# Patient Record
Sex: FEMALE | Race: BLACK OR AFRICAN AMERICAN | Employment: PART TIME | URBAN - METROPOLITAN AREA
[De-identification: names, ages, dates, MRNs, and addresses within clinical notes are randomized per-mention and may not be internally consistent; named-entity substitution may affect disease eponyms.]

---

## 2020-08-25 ENCOUNTER — VIRTUAL VISIT (OUTPATIENT)
Dept: SURGERY | Age: 30
End: 2020-08-25

## 2020-08-25 VITALS — WEIGHT: 275 LBS | HEIGHT: 65 IN | BODY MASS INDEX: 45.82 KG/M2

## 2020-08-25 DIAGNOSIS — M25.569 KNEE PAIN, UNSPECIFIED CHRONICITY, UNSPECIFIED LATERALITY: ICD-10-CM

## 2020-08-25 DIAGNOSIS — N92.6 IRREGULAR MENSES: ICD-10-CM

## 2020-08-25 DIAGNOSIS — E66.01 MORBID OBESITY WITH BMI OF 45.0-49.9, ADULT (HCC): ICD-10-CM

## 2020-08-25 DIAGNOSIS — M54.9 BACK PAIN, UNSPECIFIED BACK LOCATION, UNSPECIFIED BACK PAIN LATERALITY, UNSPECIFIED CHRONICITY: ICD-10-CM

## 2020-08-25 DIAGNOSIS — D64.9 ANEMIA, UNSPECIFIED TYPE: ICD-10-CM

## 2020-08-25 DIAGNOSIS — F17.200 SMOKER: ICD-10-CM

## 2020-08-25 DIAGNOSIS — K30 FUNCTIONAL DYSPEPSIA: ICD-10-CM

## 2020-08-25 DIAGNOSIS — E66.01 MORBID OBESITY (HCC): Primary | ICD-10-CM

## 2020-08-25 RX ORDER — BISMUTH SUBSALICYLATE 262 MG
1 TABLET,CHEWABLE ORAL DAILY
COMMUNITY

## 2020-08-25 NOTE — PATIENT INSTRUCTIONS
New patient Instructions 1. Ensure all pre-operative insurances requirements are complete (ie; dietary visits, psychology consults, primary care documentation, etc) 2. Adhere to pre-operative weight loss / weight maintenance plan discussed in the office today. 3. Contact the office with any questions on pre-operative clearance issues (ie; cardiology work-up, pulmonary work-up, upper GI study, etc). 4. If a barium upper GI study has been ordered for your evaluation, make sure you are on liquids only the morning of the procedure. Learning About Physical Activity What is physical activity? Physical activity is any kind of activity that gets your body moving. The types of physical activity that can help you get fit and stay healthy include: · Aerobic or \"cardio\" activities that make your heart beat faster and make you breathe harder, such as brisk walking, riding a bike, or running. Aerobic activities strengthen your heart and lungs and build up your endurance. · Strength training activities that make your muscles work against, or \"resist,\" something, such as lifting weights or doing push-ups. These activities help tone and strengthen your muscles. · Stretches that allow you to move your joints and muscles through their full range of motion. Stretching helps you be more flexible and avoid injury. What are the benefits of physical activity? Being active is one of the best things you can do for your health. It helps you to: · Feel stronger and have more energy to do all the things you like to do. · Focus better at school or work. · Feel, think, and sleep better. · Reach and stay at a healthy weight. · Lose fat and build lean muscle. · Lower your risk for serious health problems. · Keep your bones, muscles, and joints strong. How can you make physical activity part of your life? Get at least 30 minutes of exercise on most days of the week.  Walking is a good choice. You also may want to do other activities, such as running, swimming, cycling, or playing tennis or team sports. Pick activities that you likeones that make your heart beat faster, your muscles stronger, and your muscles and joints more flexible. If you find more than one thing you like doing, do them all. You don't have to do the same thing every day. Get your heart pumping every day. Any activity that makes your heart beat faster and keeps it at that rate for a while counts. Here are some great ways to get your heart beating faster: · Go for a brisk walk, run, or bike ride. · Go for a hike or swim. · Go in-line skating. · Play a game of touch football, basketball, or soccer. · Ride a bike. · Play tennis or racquetball. · Climb stairs. Even some household chores can be aerobicjust do them at a faster pace. Vacuuming, raking or mowing the lawn, sweeping the garage, and washing and waxing the car all can help get your heart rate up. Strengthen your muscles during the week. You don't have to lift heavy weights or grow big, bulky muscles to get stronger. Doing a few simple activities that make your muscles work against, or \"resist,\" something can help you get stronger. For example, you can: · Do push-ups or sit-ups, which use your own body weight as resistance. · Lift weights or dumbbells or use stretch bands at home or in a gym or community center. Stretch your muscles often. Stretching will help you as you become more active. It can help you stay flexible, loosen tight muscles, and avoid injury. It can also help improve your balance and posture and can be a great way to relax. Be sure to stretch the muscles you'll be using when you work out. It's best to warm your muscles slightly before you stretch them. Walk or do some other light aerobic activity for a few minutes, and then start stretching. When you stretch your muscles: · Do it slowly. Stretching is not about going fast or making sudden movements. · Don't push or bounce during a stretch. · Hold each stretch for at least 15 to 30 seconds, if you can. You should feel a stretch in the muscle, but not pain. · Breathe out as you do the stretch. Then breathe in as you hold the stretch. Don't hold your breath. If you're worried about how more activity might affect your health, have a checkup before you start. Follow any special advice your doctor gives you for getting a smart start. Where can you learn more? Go to http://honorio-lucero.info/ Enter B832 in the search box to learn more about \"Learning About Physical Activity. \" Current as of: January 16, 2020               Content Version: 12.5 © 6381-9800 [employer]. Care instructions adapted under license by [employer] (which disclaims liability or warranty for this information). If you have questions about a medical condition or this instruction, always ask your healthcare professional. [address] any warranty or liability for your use of this information.

## 2020-08-25 NOTE — PROGRESS NOTES
Pt is a New Patient. She wants to discuss surgery options.  Pt has a virtual appt this morning with [facility]

## 2020-08-25 NOTE — PROGRESS NOTES
Bariatric Surgery Consultation    Subjective: The patient is a 35 y.o. obese female with a Body mass index is 45.76 kg/m². .  The patient is at her heaviest weight for the past several years. she has been overweight since finishing high school. she has been considering surgery since past 3 years, both her mom and grandmother have had gastric bypass. she desires surgery at this time because of multiple health concerns and their lifestyle issues which are hindered by their weight. she has been referred by his family physician Dr Spencer Castro for evaluation and treatment of their obesity via surgical intervention. Lanny Bhardwaj has tried multiple diets in her lifetime most recently tried behavior modification and unsupervised diets    Bariatric comorbidities present are   Patient Active Problem List   Diagnosis Code    Morbid obesity ([facility] E66.01    Morbid obesity with BMI of 45.0-49.9, adult ([facility] [address] E66.01, Z68.42    Anemia D64.9    Irregular menses N92.6    Smoker F17.200    Back pain M54.9    Knee pain M25.569       The patient is considering laparoscopic gastric bypass surgery for surgical weight loss due to their ineffective progress with medical forms of weight loss and the urging of their physician who cares for their primary medical issues. The patient  now presents  for consideration for weight loss surgery understanding the benefits of this over a medical approach of weight loss as was discussed in our presentation on weight loss surgery. They have discussed their plans both with their family and primary care physician who is in support of their pursuit of such. The patient has not had health issues as of late and denies and gastrointestinal disturbances other than what is outlined below in their review of symptoms. All of their prior evaluations available by both their PCP's and specialists physicians have been reviewed today either in the Care Everywhere portal or scanned under the media tab.     I have spent a large portion of my initial consultation today reviewing the patients current dietary habits which have contributed to their health issues and obesity. I have suggested to them personally a dietary regimen that they can initiate now to help with their status as it pertains to their weight. They understand that the most important aspect of their journey through their weight loss endeavor will be their adherence to a new lifestyle of healthy eating behavior. They also understand that an adherence to an exercise program will not only help with weight loss but is ultimately important in weight maintenance. The patients goal weight is 165 lb. These goals are consistent with expected outcomes of their desired operation. her Medical goals are resolution of these health issues. Patient Active Problem List    Diagnosis Date Noted    Morbid obesity ([facility] 08/25/2020    Morbid obesity with BMI of 45.0-49.9, adult ([facility] 08/25/2020    Smoker 08/25/2020    Back pain 08/25/2020    Knee pain 08/25/2020    Anemia     Irregular menses      History reviewed. No pertinent surgical history. Social History     Tobacco Use    Smoking status: Light Tobacco Smoker     Types: Cigarettes    Smokeless tobacco: Never Used   Substance Use Topics    Alcohol use: Never     Frequency: Never      Family History   Problem Relation Age of Onset    No Known Problems Mother       Current Outpatient Medications   Medication Sig Dispense Refill    multivitamin (ONE A DAY) tablet Take 1 Tab by mouth daily.        No Known Allergies       Review of Systems:       General - No history or complaints of unexpected fever, chills, or weight loss  Head/Neck - No history or complaints of headache, diplopia, dysphagia, hearing loss  Cardiac - No history or complaints of chest pain, palpitations, murmur, or shortness of breath  Pulmonary - No history or complaints of shortness of breath, productive cough, hemoptysis  Gastrointestinal - rare reflux,no  abdominal pain, obstipation/constipation or blood per rectum  Genitourinary - No history or complaints of hematuria/dysuria, stress urinary incontinence symptoms, or renal lithiasis  Musculoskeletal - has joint pain in their knees and back,  no muscular weakness  Hematologic - No history or complaints of bleeding disorders,  No blood transfusions  Neurologic - No history or complaints of  migraine headaches, seizure activity, syncopal episodes, TIA or stroke  Integumentary - No history or complaints of rashes, abnormal nevi, skin cancer  Gynecological - irregular menses with occ heavy bleeding               Objective:     Visit Vitals  Ht 5' 5\" (1.651 m)   Wt 124.7 kg (275 lb)   BMI 45.76 kg/m²       Physical Examination:   General appearance - well appearing and in no distress  Mental status - alert, oriented to person, place, and time  Pulmonary - normal respiratory effort  Abdomen - no obvious distention  Neurological - normal speech, no focal findings or movement disorder noted  Extremities - normal movement  Musculoskeletal - moving extremities without difficulty  Skin - no rashes, no suspicious skin lesions noted      Labs:       No results found for this or any previous visit (from the past 1440 hour(s)). Assessment:     Morbid obesity with comorbidity    Plan:     laparoscopic gastric bypass surgery vs laparoscopic sleeve gastrectomy    This is a 35 y.o. female with a BMI of Body mass index is 45.76 kg/m². and the weight-related co-morbidties as noted below. Félix Zapien meets the NIH criteria for bariatric surgery based upon the BMI of Body mass index is 45.76 kg/m². and multiple weight-related co-morbidties. Félix Zapien has elected laparoscopic sleeve gastrectomy as her intervention of choice for treatment of morbid obestiy through surgical means secondary to its safety profile, rapid return to work  and decreases in operative risks over gastric bypass.     In the office today, following Kavitha's history and physical examination, a 30 minute discussion regarding the anatomic alterations for the laparoscopic sleeve gastrectomy was undertaken. The dietary expectations and the patient and physician dependent factors for success were thoroughly discussed, to include the need for interval follow-up and long-term dietary changes associated with success. The possible complications of the sleeve gastrectomy  were also discussed, to include;death, DVT/PE, staple line leak, bleeding, stricture formation, infection, nutritional deficiencies and sleeve dilation. Specific weight related outcomes for success were also discussed with an emphasis on careful and close follow-up with the first year and eating behavior modification as the baseline and cyclical hunger return. The patient expressed an understanding of the above factors, and her questions were answered in their entirety. In addition, the patient watched a 1.5 hour power point seminar regarding obesity, surgical weight loss including, adjustable gastric band, gastric bypass, and sleeve gastrectomy. This discussion contrasted the different surgical techniques, mechanisms of actions and expected outcomes, and surgical and medical risks associated with each procedure. Today, the patient had all of her questions answered and desires to proceed with  bariatric surgery initially choosing sleeve gastrectomy as her surgical option. She initially seems more interested in the gastric bypass because of other family members having the surgery, but we discussed the need to quit smoking for life with that surgery due to increased risk of ulcerations/perforations. She is amenable to possibly considering the sleeve gastrectomy as well.     Secondary Diagnoses:     Dietary Intervention  - The patient is currently scheduled to see or has been followed by a bariatric nutritionist for an attempt at preoperative weight loss as has been dictated by their insurance carrier. They will be assessed at various times during their follow up to evaluate their progress depending on the length of time that is required once again by their carrier. I have explained the importance of preoperative weight loss and the benefits regarding lower surgical risk and also assisting the patient in reaching their weight loss goal.  Finally they understand there is a physiologic benefit from the standpoint of hepatic volume reduction and reduction of central visceral adiposity preoperatively. I have reiterated the importance of a low carbohydrate and high protein regimen to achieve their stated goal. I have reviewed their current eating behavior prior to this encounter and explained to them in an exhaustive fashion the appropriate diet that they should adhere to. They have been encouraged to loose weight pre operatively and understand it is our prerogative to cancel surgery or postpone their procedure in the event of significant weight gain. The patients weight loss goal pre operatively is 5-10 pounds. Smoking Cessation - Today I have counseled the patient extensively regarding smoking cessation. They have been counseled extensively about the detrimental effects of smoking on their weight loss surgical procedure particularly for the gastric bypass and sleeve gastrectomy procedures. They understand that smoking leads to pulmonary issues postoperatively and can lead to gastric ulcers and marginal ulcers in the post bariatric surgery pouch that has been created. They understand that they must stop smoking prior to surgery or it may affect their ultimate progression to their procedure. This visit with Ms Celeste Tamez was performed under virtual telemedicine guidelines during the coronavirus (KBWMF-48) public health emergency on 8/25/20 in an interactive fashion using [employer]. me.   They understand that this telemedicine encounter is a billable service, with coverage determined by their insurance carrier. They are aware that they may receive a bill and have provided verbal consent for this virtual visit. This visit was performed with the patient in their home environment and provider was present at [facility]. I have spent over 30 minutes on this visit  both prior to the visit reviewing the patients chart and with the patient face to face. I have reviewed their medical history, performed a telemedicine physical examination, and discussed the plan of action to date. They understand that they will be asked to come to the office when our office is allowed normal patient interaction, as dictated by public health officials, for a face-to-face visit to rediscuss all of the things we have talked about today.         Signed By: Renée Thornton NP     August 25, 2020

## 2020-09-22 ENCOUNTER — CLINICAL SUPPORT (OUTPATIENT)
Dept: SURGERY | Age: 30
End: 2020-09-22

## 2020-09-22 VITALS — BODY MASS INDEX: 45.82 KG/M2 | WEIGHT: 275 LBS | HEIGHT: 65 IN

## 2020-09-22 DIAGNOSIS — E66.01 MORBID OBESITY (HCC): Primary | ICD-10-CM

## 2020-09-22 NOTE — PROGRESS NOTES
Medical Weight Loss Multi-Disciplinary Program    Name: Lisa Carlos   : 1990    Session# 1  Date: 2020    Patient reports quitting smoking (2020)      Height: 5' 5\" (165.1 cm)    Weight: 124.7 kg (275 lb) lbs. Body mass index is 45.76 kg/m². - 5-10 lbs weight loss goal    Dietitian: January Friedman is a 35 y.o. female who present for a pre-op evaluation. Visit Vitals  Ht 5' 5\" (1.651 m)   Wt 124.7 kg (275 lb)   BMI 45.76 kg/m²     Past Medical History:   Diagnosis Date    Anemia     Anxiety     Back injury     Back pain 2020    Depression     Irregular menses     Knee pain 2020    Smoker 2020         Procedure:  laparoscopic sleeve gastrectomy     Reasons for Surgery:  BMI > 40 with one or more medically significant comorbidities    Summary:  Pt given brief pre/post-op diet ed and diet hx reviewed. Pt instructed to follow a low calorie, low carbohydrate, high protein diet of about 3548-1637 calories daily. Pt set several goals. See below. What have you done in the past to try to lose weight? Calorie controlled, exercise programs, portion controlled program    Why didn't you lose weight or keep the weight off?: Patient was able to lose 40 lbs with portion control and exercise. Why do you think having weight loss surgery will make it possible for you to lose weight and keep it off? Patient has been working to lose weight over the past 1-2 years, she wants to focus on improving your health - hopes surgery will be tool to help her control her appetite and portions     Dietary Instructions    Nutritional Hx: What is the number of meals you eat per day? 2  Comment:     Do you eat between meals / snack? no  Typical snack: How fast do you eat your meals? slow    How often do you eat fast food? 1 times a week    How many sodas/sugared beverages do you drink per day?  20-32 oz sweet tea, sugar sweetened coffee    How many caffeinated drinks do you have per day? Iced coffee, sweet tea    How much milk and/or juice do you drink per day? Not typicall    How much water do you drink per day? 6 (8oz glasses)    How often do you consume alcohol? Rarely, social occasions     Current Vitamins: Women's MVI     Diet History:    Typical intake is as follows:  8:30 am - wake- up  Breakfast: 9:30 am - 1 pack of instant, 2 eggs, 3 slices of pederson, 2 slices of toast with Sweet tea OR 1 banana with sugar sweetened yogurt OR SKIPS with Iced coffee (caramel iced coffee with 1 pump french vanilla and sugar)   Lunch: SKIPs  Dinner: eating earlier dinner due to trying to stop eating before 8:00 pm - Chicken fried rice with vegetables (homemade)  Snacks: reports she does not typically snack   Fluids: Water- 3- 16.9 oz bottle, Sweet tea - (1 - 20-32 oz cup per day)     Reviewed intake  Understanding low carbohydrates, low sugar, higher protein meals  Understanding proper portions  Dining outside home  Instruction given for personal dietary changes  Discussed perceived compliance  Comments: Pt given brief pre/post-op diet ed and diet hx reviewed. Patient Education and Materials Provided:  Supplement [facility], B Vitamin Information, MVI Recommendations, Calcium Citrate Information, Bariatric Supplement Companies, Protein Supplement Information, Fluid Requirements, No Caffeine or Carbonation, No Alcohol for One Year Post Op, 3 Balanced Meals a Day, Food Group Guide, Good Choices Dining Out, No Snacks, No Concentrated Sweets, Support System at Home, Exercising, Support Group Information and Addressed Current Habits / Changes to make  Physical Activity/Exercise    Discussed Perceived Compliance  Reasonable Goals Set  Motivation  Comments: none    Exercise:  Do you currently have an exercise routine? No - but does try to walk a trail near her house 2-3 times per week     Goals:   1. Work to increase to 3-4 small meals per day, with planned snacks as needed.   Recommend following plate method for meal planning - focusing on lean protein, non-starchy vegetables, and measured amounts of starch. - Goal of  g protein and  g carbohydrate per day. - Recommend continuing protein supplement as meal replacement at least 1x/day  2. Increase non caloric fluid to 64 oz per day. Eliminate caffeine, added sugar, carbonation, and straws.               -Continue to work to decrease sugar sweetened beverages - goal of calorie free beverages only              -Must eliminate caffeine prior to surgery and avoid for ~6-8 weeks  3. Start activity regimen, work to increase ADL              -Try to start walking for at least 30-60  minutes 5 days per week  4. Continue MVI     Candidate for surgery (per RD):  PENDING  Estelle Burris, [facility]  09/22/20

## 2020-10-21 ENCOUNTER — APPOINTMENT (OUTPATIENT)
Dept: GENERAL RADIOLOGY | Age: 30
End: 2020-10-21
Attending: SPECIALIST
Payer: MEDICAID

## 2020-10-21 ENCOUNTER — HOSPITAL ENCOUNTER (OUTPATIENT)
Age: 30
Setting detail: OUTPATIENT SURGERY
Discharge: HOME | End: 2020-10-21
Attending: SPECIALIST | Admitting: SPECIALIST
Payer: MEDICAID

## 2020-10-21 VITALS
HEIGHT: 65 IN | BODY MASS INDEX: 45.27 KG/M2 | HEART RATE: 92 BPM | TEMPERATURE: 98.3 F | RESPIRATION RATE: 16 BRPM | WEIGHT: 271.7 LBS | DIASTOLIC BLOOD PRESSURE: 85 MMHG | SYSTOLIC BLOOD PRESSURE: 143 MMHG | OXYGEN SATURATION: 96 %

## 2020-10-21 DIAGNOSIS — E66.01 MORBID OBESITY (HCC): ICD-10-CM

## 2020-10-21 PROCEDURE — 2709999900 HC NON-CHARGEABLE SUPPLY: Performed by: SPECIALIST

## 2020-10-21 PROCEDURE — 74240 X-RAY XM UPR GI TRC 1CNTRST: CPT

## 2020-10-21 PROCEDURE — 76040000019: Performed by: SPECIALIST

## 2020-10-21 PROCEDURE — 77030040361 HC SLV COMPR DVT MDII -B: Performed by: SPECIALIST

## 2020-10-21 PROCEDURE — 74011000255 HC RX REV CODE- 255: Performed by: SPECIALIST

## 2020-10-21 NOTE — PROCEDURES
Bekahantonio Pacheco   : 1990  Medical Record ZLEPRC:[id number]            PREPROCEDURE DIAGNOSIS: This patient is preoperative for laparoscopic sleeve gastrectomyprocedure with a history of  reflux disease. POSTPROCEDURE DIAGNOSIS: This patient is preoperative for laparoscopic sleeve gastrectomyprocedure with a history of  reflux disease. PROCEDURES PERFORMED: Upper GI study with barium. ESTIMATED BLOOD LOSS: None. SPECIMENS: None. STATEMENT OF MEDICAL NECESSITY: The patient is a patient with a  longstanding history of obesity. They are now considering the laparoscopic sleeve gastrectomyprocedure as a means of surgical weight control and due to their history of reflux disease and are being assessed preoperatively for such. DESCRIPTION OF PROCEDURE: The patient was brought to the fluoroscopy unit and  was given thin barium. On swallowing of barium, they were noted to have  normal peristalsis of their esophagus. They had prompt filling of distal  esophagus with tapering into the gastroesophageal junction. There was no evidence of a hiatal hernia present. Contrast then filled the gastric cardia, fundus,body and pre pyloric region with no abnormalities noted. Contrast then exited the pylorus in normal fashion. No obstruction was noted. There was no evidence of reflux noted.     (normal anatomy - pt still unsure of surgery choice)    Trina Alvarez MD

## 2020-10-30 ENCOUNTER — CLINICAL SUPPORT (OUTPATIENT)
Dept: SURGERY | Age: 30
End: 2020-10-30

## 2020-10-30 VITALS — WEIGHT: 273 LBS | HEIGHT: 65 IN | BODY MASS INDEX: 45.48 KG/M2

## 2020-10-30 DIAGNOSIS — E66.01 MORBID OBESITY WITH BMI OF 45.0-49.9, ADULT (HCC): Primary | ICD-10-CM

## 2020-10-30 NOTE — PROGRESS NOTES
Medical Weight Loss Multi-Disciplinary Program    Name: Derick Lemus   : 1990    Session# 2  Date: 10/30/2020    Visit Vitals  Ht 5' 5\" (1.651 m)   Wt 123.8 kg (273 lb)   BMI 45.43 kg/m²     Pt has gained ~2 pounds in this last month. Patient reports quitting smoking (2020)    Dietary Instructions    Reviewed intake  Understanding low carbohydrates, low sugar, higher protein meals  Instruction given for personal dietary changes  Discussed perceived compliance  Comments: Diet hx reviewed and personal dietary changes discussed. Reviewed recommendation to follow 1319-4379 calorie diet, working to reduce total carbohydrate intake to  g or less per day and increasing protein intake to  g per day, compared current intake to recommendations. Today the majority of our visit was spent reviewing patient's food recall and identifying areas for improvement. Educated  on the importance of eating 3 meals/day at regularly scheduled times including breakfast within 1st 1-2 hours of waking. Suggested patient use a protein supplement as a meal replacement instead of skipping OR as a between meal high protein snack. Also educated on the importance of including a protein with every meal and snack and reviewed lean sources. Lastly introduced  the Plate Method for Meal Planning and used food models to assist with visualizing recommended serving sizes. Pt has been increasing lean protein (fish, chicken). Pt has also been increasing non-starchy vegetables and fruit. Pt also reduced bread/grain intake. Pt has been eating fruit and juice though. Pt has reviewed nutrition packet. Pt's mom has had surgery but has turned vegan. Nutritional Hx: What is the number of meals you eat per day? 2 - continues  Comment: (breakfast + dinner) - late breakfast d/t sleeping late    Do you eat between meals / snack? no  Typical snack: How fast do you eat your meals? slow    How often do you eat fast food? Reduced from 3x/week to none this month. How many sodas/sugared beverages do you drink per day? 20-32 oz sweet tea, sugar sweetened coffee - reduced    How many caffeinated drinks do you have per day? Iced coffee, sweet tea    How much milk and/or juice do you drink per day? Not typically - increased apple juice    How much water do you drink per day? 6 (8oz glasses)    How often do you consume alcohol? Rarely, social occasions     Current Vitamins: Women's MVI     Diet History:    Typical intake is as follows:  8:30 am - wake- up  Breakfast: 36:83ND -  2 eggs, 3 slices of pederson,with Sweet tea OR 1 banana with sugar sweetened yogurt OR SKIPS  Lunch: SKIPs  Dinner: eating earlier dinner due to trying to stop eating before 8:00 pm - Chicken fried rice with vegetables (homemade) OR Pesto sauce + spinach + shrimp (sometimes adds a pasta or bread). Snacks: reports she does not typically snack   Fluids: Water + apple juice    Physical Activity/Exercise    Discussed Perceived Compliance  Motivation    Patient has not started physical activity regimen, reinforced the importance of regular physical activity for total health and weight management. No - but does try to walk a trail near her house 2-3 times per week. Pt is looking for a  - plans on trying out a new . Suggested starting walking or cardio for at least 3-7 days per week for 30-60 minutes, patient was  receptive to recommendation. Behavior Modification    Identify obstacles to trigger change  Achieving/Rewarding goals met  Positive attitude  Comments: Reinforced importance continuing to modify lifestyle patterns and behaviors to promote weight loss and long term weight maintenance       Goals:   1. Work to increase to 3-4 small meals per day, with planned snacks as needed. Recommend following plate method for meal planning - focusing on lean protein, non-starchy vegetables, and measured amounts of starch.    - Goal of  g protein and  g carbohydrate per day. - Recommend continuing protein supplement as meal replacement at least 1x/day OR as high protein snack option  2. Increase non caloric fluid to 64 oz per day. Eliminate caffeine, added sugar, carbonation, and straws.               -Continue to work to decrease sugar sweetened beverages - goal of calorie free beverages only              -Must eliminate caffeine prior to surgery and avoid for ~6-8 weeks   -Practice 30:30 rule,  food and flood   3. Start activity regimen, work to increase ADL  4. Start Complete MVI    Candidate for surgery (per RD):  PENDING    Dietitian: Amada Lawler RD

## 2020-11-24 ENCOUNTER — CLINICAL SUPPORT (OUTPATIENT)
Dept: SURGERY | Age: 30
End: 2020-11-24

## 2020-11-24 VITALS — WEIGHT: 273 LBS | HEIGHT: 65 IN | BODY MASS INDEX: 45.48 KG/M2

## 2020-11-24 DIAGNOSIS — E66.01 MORBID OBESITY WITH BMI OF 45.0-49.9, ADULT (HCC): Primary | ICD-10-CM

## 2020-11-24 NOTE — PROGRESS NOTES
Medical Weight Loss Multi-Disciplinary Program    Name: Jill Simeon   : 1990    Session# 3  Date: 2020    Visit Vitals  Ht 5' 5\" (1.651 m)   Wt 123.8 kg (273 lb)   BMI 45.43 kg/m²       Dietary Instructions    Reviewed intake  Understanding low carbohydrates, low sugar, higher protein meals  Instruction given for personal dietary changes  Discussed perceived compliance  Comments: Diet hx reviewed and personal dietary changes discussed. Reviewed recommendation to follow [id number] calorie diet, working to reduce total carbohydrate intake to  g or less per day and increasing protein intake to  g per day, compared current intake to recommendations. Patient participated in pre-recorded education class video. Recorded video of dietitian discussing key diet principles following weight loss surgery, importance of high protein diet, sources of protein, tips for following low carbohydrate diet, and ways to measure carbohydrates utilizing carbohydrate counting. Discussed importance of sampling protein supplements, protein supplement label guidelines and popularly selected items. Also reviewed the key vitamins and minerals to supplement long term after surgery. But these vitamins will be reviewed again in a pre-operative class. Patient watched the video in its entirety and turned in the 3 embedded passcodes from the video session. Behavior Modification    Identify obstacles to trigger change  Achieving/Rewarding goals met  Positive attitude  Comments: Reinforced importance continuing to modify lifestyle patterns and behaviors to promote weight loss and long term weight maintenance     Comments:  During today's lesson, I also spent some time talking about behavior changes. I talked to patient about the importance of taking vitamins post op and we reviewed the vitamins that patients will be taking post op. Patient will hear this again at pre op class before surgery.   Patient had the opportunity to ask questions about these vitamins that will be lifelong. Goals:   1. Work to increase to 3-4 small meals per day, with planned snacks as needed. Recommend following plate method for meal planning - focusing on lean protein, non-starchy vegetables, and measured amounts of starch. - Goal of  g protein and  g carbohydrate per day. - Recommend continuing protein supplement as meal replacement at least 1x/day OR as high protein snack option  2. Increase non caloric fluid to 64 oz per day. Eliminate caffeine, added sugar, carbonation, and straws.               -Continue to work to decrease sugar sweetened beverages - goal of calorie free beverages only              -Must eliminate caffeine prior to surgery and avoid for ~6-8 weeks   -Practice 30:30 rule,  food and flood   3. Start activity regimen, work to increase ADL  4. Start Complete MVI    Candidate for surgery (per RD):  PENDING  Zenobia Yoo RD

## 2020-11-30 ENCOUNTER — OFFICE VISIT (OUTPATIENT)
Dept: SURGERY | Age: 30
End: 2020-11-30
Payer: MEDICAID

## 2020-11-30 VITALS
BODY MASS INDEX: 46.5 KG/M2 | HEIGHT: 65 IN | TEMPERATURE: 97.7 F | WEIGHT: 279.1 LBS | HEART RATE: 88 BPM | SYSTOLIC BLOOD PRESSURE: 140 MMHG | DIASTOLIC BLOOD PRESSURE: 86 MMHG | OXYGEN SATURATION: 100 % | RESPIRATION RATE: 16 BRPM

## 2020-11-30 DIAGNOSIS — M25.569 KNEE PAIN, UNSPECIFIED CHRONICITY, UNSPECIFIED LATERALITY: ICD-10-CM

## 2020-11-30 DIAGNOSIS — E66.01 MORBID OBESITY (HCC): Primary | ICD-10-CM

## 2020-11-30 DIAGNOSIS — D64.9 ANEMIA, UNSPECIFIED TYPE: ICD-10-CM

## 2020-11-30 DIAGNOSIS — E66.01 MORBID OBESITY WITH BMI OF 45.0-49.9, ADULT (HCC): ICD-10-CM

## 2020-11-30 DIAGNOSIS — F17.200 SMOKER: ICD-10-CM

## 2020-11-30 DIAGNOSIS — M54.9 BACK PAIN, UNSPECIFIED BACK LOCATION, UNSPECIFIED BACK PAIN LATERALITY, UNSPECIFIED CHRONICITY: ICD-10-CM

## 2020-11-30 DIAGNOSIS — N92.6 IRREGULAR MENSES: ICD-10-CM

## 2020-11-30 PROCEDURE — 99214 OFFICE O/P EST MOD 30 MIN: CPT | Performed by: SPECIALIST

## 2020-11-30 NOTE — PATIENT INSTRUCTIONS
Supplement Resource Guide Importance of Protein:  
Maintains lean body mass, produces antibodies to fight off infections, heals wounds, minimizes hair loss, helps to give you energy, helps with satiety, and keeping you full between meals. Importance of Calcium: 
Needed for healthy bones and teeth, normal blood clotting, and nervous system functioning, higher risk of osteoporosis and bone disease with non-compliance. Importance of Multivitamins: Many functions. Supply you with extra nutrients that you may be missing from food. May lead to iron deficiency anemia, weakness, fatigue, and many other symptoms with non-compliance. Importance of B Vitamins: 
Important for red blood cell formation, metabolism, energy, and helps to maintain a healthy nervous system. Protein Supplement Find one you like now. Use immediately after surgery. Look for: 
35-50g protein each day from your protein supplement once you reach the progression diet. 0-3 g fat per serving 0-3 g sugar per serving Protein drinks should be split in separate dosages. Recommend: Lifelong 1 year + Calcium Supplement:  
 
Start taking within a month after surgery. Look for: Calcium Citrate Plus D (1500 mg per day) Recommend: Citracal 
 
 . Avoid chocolate chewable calcium. Can use chewable bariatric or [employer] brand or similar chewable. The body cannot absorb more than 500-600 mg of calcium at a time. Take for Life Multi-vitamin Supplement:   
 
Start immediately after surgery: any complete chewable, such as: [place]s Complete chewables. Avoid [place] sours or gummies. They lack iron and other important nutrients and also have added sugar. Continue with chewable vitamin or change to adult complete multivitamin one month after surgery. Menstruating women can take a prenatal vitamin. Make sure has at least 18 mg iron and 731-557 mcg folic acid Vitamin B12, B Complex Vitamin, and Biotin Start taking within a month after surgery. Vitamin B12:  1000 mcg of Vitamin B12 three times weekly Must take sublingually (meaning you take it under your tongue) or in a liquid drop form for easy absorption. B Complex Vitamin: Take a pill or liquid drop form once daily. Biotin: This vitamin can help prevent hair loss. Recommend 5mg  
(5000 mcg) a day Biotin is Optional  
 
 
 
 
  
Learning About Physical Activity What is physical activity? Physical activity is any kind of activity that gets your body moving. The types of physical activity that can help you get fit and stay healthy include: · Aerobic or \"cardio\" activities that make your heart beat faster and make you breathe harder, such as brisk walking, riding a bike, or running. Aerobic activities strengthen your heart and lungs and build up your endurance. · Strength training activities that make your muscles work against, or \"resist,\" something, such as lifting weights or doing push-ups. These activities help tone and strengthen your muscles. · Stretches that allow you to move your joints and muscles through their full range of motion. Stretching helps you be more flexible and avoid injury. What are the benefits of physical activity? Being active is one of the best things you can do for your health. It helps you to: · Feel stronger and have more energy to do all the things you like to do. · Focus better at school or work. · Feel, think, and sleep better. · Reach and stay at a healthy weight. · Lose fat and build lean muscle. · Lower your risk for serious health problems. · Keep your bones, muscles, and joints strong. How can you make physical activity part of your life? Get at least 30 minutes of exercise on most days of the week. Walking is a good choice. You also may want to do other activities, such as running, swimming, cycling, or playing tennis or team sports. Pick activities that you likeones that make your heart beat faster, your muscles stronger, and your muscles and joints more flexible. If you find more than one thing you like doing, do them all. You don't have to do the same thing every day. Get your heart pumping every day. Any activity that makes your heart beat faster and keeps it at that rate for a while counts. Here are some great ways to get your heart beating faster: · Go for a brisk walk, run, or bike ride. · Go for a hike or swim. · Go in-line skating. · Play a game of touch football, basketball, or soccer. · Ride a bike. · Play tennis or racquetball. · Climb stairs. Even some household chores can be aerobicjust do them at a faster pace. Vacuuming, raking or mowing the lawn, sweeping the garage, and washing and waxing the car all can help get your heart rate up. Strengthen your muscles during the week. You don't have to lift heavy weights or grow big, bulky muscles to get stronger. Doing a few simple activities that make your muscles work against, or \"resist,\" something can help you get stronger. For example, you can: · Do push-ups or sit-ups, which use your own body weight as resistance. · Lift weights or dumbbells or use stretch bands at home or in a gym or community center. Stretch your muscles often. Stretching will help you as you become more active. It can help you stay flexible, loosen tight muscles, and avoid injury. It can also help improve your balance and posture and can be a great way to relax. Be sure to stretch the muscles you'll be using when you work out. It's best to warm your muscles slightly before you stretch them. Walk or do some other light aerobic activity for a few minutes, and then start stretching. When you stretch your muscles: · Do it slowly. Stretching is not about going fast or making sudden movements. · Don't push or bounce during a stretch. · Hold each stretch for at least 15 to 30 seconds, if you can. You should feel a stretch in the muscle, but not pain. · Breathe out as you do the stretch. Then breathe in as you hold the stretch. Don't hold your breath. If you're worried about how more activity might affect your health, have a checkup before you start. Follow any special advice your doctor gives you for getting a smart start. Where can you learn more? Go to http://www.gray.com/ Enter G078 in the search box to learn more about \"Learning About Physical Activity. \" Current as of: January 16, 2020               Content Version: 12.6 © 5211-1447 [employer], [employer]. Care instructions adapted under license by [employer] (which disclaims liability or warranty for this information). If you have questions about a medical condition or this instruction, always ask your healthcare professional. [address] any warranty or liability for your use of this information.

## 2020-11-30 NOTE — PROGRESS NOTES
Bariatric Surgery Consultation    Subjective:     Kylee Mackey is a 35 y.o. obese female with a Body mass index is 46.44 kg/m². Caty Mackey desires surgery at this time because of health issues and quality of life issues. Kylee Mackey has been seen by a bariatric nutritionist and has been placed on an appropriate low carbohydrate diet. The patient desires laparoscopic gastric bypass surgery for surgical weight loss, however she is not currently a surgical candidate due to pending work up. Kylee Mackey is here today to check progress with weight loss / evaluate nutritional status and review all subspecialty clearances in hopes of proceeding to the operating room. Office visit notes from August 2020 to present have been reviewed. Kylee Mackey has increased fluid intake, is focusing on protein, is eating regularly, is taking a multivitamin & has increased her activity. No recent visits with PCP. No new medications. UGI done 10/21/20 showed normal anatomy    Had pulmonary appt for sleep apnea evaluation, waiting to get home sleep study kit    Has cardiac evaluation appt with Dr Henrry Thompson 12/4/20    Patient Active Problem List    Diagnosis Date Noted    Morbid obesity ([facility] [address] 08/25/2020    Morbid obesity with BMI of 45.0-49.9, adult ([facility] [address] 08/25/2020    Smoker 08/25/2020    Back pain 08/25/2020    Knee pain 08/25/2020    Anemia     Irregular menses       No past surgical history on file. Social History     Tobacco Use    Smoking status: Never Smoker    Smokeless tobacco: Never Used    Tobacco comment: 1 black and mild a week   Substance Use Topics    Alcohol use: Never     Frequency: Never      Family History   Problem Relation Age of Onset    No Known Problems Mother       Current Outpatient Medications   Medication Sig Dispense Refill    multivitamin (ONE A DAY) tablet Take 1 Tab by mouth daily.        No Known Allergies       Review of Systems:        General - No history or complaints of unexpected fever, chills, or weight loss  Head/Neck - No history or complaints of headache, diplopia, dysphagia, hearing loss  Cardiac - No history or complaints of chest pain, palpitations, murmur, or shortness of breath  Pulmonary - No history or complaints of shortness of breath, productive cough, hemoptysis  Gastrointestinal - rare reflux,  abdominal pain, obstipation/constipation, blood per rectum  Genitourinary - No history or complaints of hematuria/dysuria, stress urinary incontinence symptoms, or renal lithiasis  Musculoskeletal - has joint pain in knees and back, no muscular weakness  Hematologic - No history or complaints of bleeding disorders, blood transfusions, sickle cell anemia  Neurologic - No history or complaints of  migraine headaches, seizure activity, syncopal episodes, TIA or stroke  Integumentary - No history or complaints of rashes, abnormal nevi, skin cancer  Gynecological - Irregular menses with occ heavy bleeding    Objective:     Visit Vitals  BP (!) 140/86 (BP 1 Location: Left arm, BP Patient Position: Sitting)   Pulse 88   Temp 97.7 °F (36.5 °C)   Resp 16   Ht 5' 5\" (1.651 m)   Wt 126.6 kg (279 lb 1.6 oz)   SpO2 100%   BMI 46.44 kg/m²       Physical Exam:     Physical Examination: General appearance - alert, well appearing, and in no distress and oriented to person, place, and time  Mental status - alert, oriented to person, place, and time, normal mood, behavior, speech, dress, motor activity, and thought processes  Eyes - pupils equal and reactive, extraocular eye movements intact, sclera anicteric, left eye normal, right eye normal  Ears - right ear normal, left ear normal  Nose - normal and patent, no erythema, discharge or polyps  Mouth - mucous membranes moist, pharynx normal without lesions  Neck - supple, no significant adenopathy  Lymphatics - no palpable lymphadenopathy, no hepatosplenomegaly  Chest - clear to auscultation, no wheezes, rales or rhonchi, symmetric air entry  Heart - normal rate, regular rhythm, normal S1, S2, no murmurs, rubs, clicks or gallops  Abdomen - soft, nontender, nondistended, no masses or organomegaly  Back exam - full range of motion, no tenderness, palpable spasm or pain on motion  Neurological - alert, oriented, normal speech, no focal findings or movement disorder noted  Musculoskeletal - no joint tenderness, deformity or swelling  Extremities - peripheral pulses normal, no pedal edema, no clubbing or cyanosis    Labs:     No results found for this or any previous visit (from the past 2016 hour(s)). Reviewed labs in Care Everywhere date 11/23/20      Assessment:     Morbid obesity with associated comorbidity, severe central obesity & outstanding insurance requirements. Plan: To continue current medications & routine follow-up with PCP. Continuation of Pre-Operative evaluation / clearance:  Eugenia Rubi has returned to the office today to discuss her status as a surgical candidate. Progress has been noted and reviewed - including review of notes from dietician. Eugenia Rubi is being compliant with follow-up & recommendations. Eugenia Rubi has 9-14 more pounds to lose before proceeding to the OR. (4 pounds gained since last visit)  Eugenia Rubi has 3 more nutritional visits to complete before proceeding to the OR. Eugenia Rubi has an outstanding cardiac, pulmonary, PCP and psychological clearance to review before proceeding to the OR. Eugenia Rubi will return in 1 month to continue the pre-operative process and to work towards goals as outlined. Eugenia Rubi understand the rationales for all the above and plans to follow the diet & activity recommendations of the dietician. It has been   discussed that given her   condition that the best surgical option for this patient would be the laparoscopic gastric bypass surgery.   Eugenia Rubi agrees   with the surgical choice and has been educated in it's; risks, benefits, and alternatives. We will continue with the pre-operative evaluation as needed to check progress. I spent a considerable amount of time today discussing the patient's marijuana use which is quite extensive on a daily basis. She understands that in order to have surgery, she must be off of that completely. She also understands that long-term gastric bypass patients usually do not tolerate smoke in any form such as marijuana use and this can lead to an intractable ulcer. She thinks she can stop smoking and she does understand that we will test her preoperatively prior to proceeding with her surgical procedure    Secondary Diagnoses:     Dietary Intervention  - The patient is currently followed by a bariatric nutritionist for an attempt at preoperative weight loss as has been dictated by their insurance carrier. They will be assessed at various times during their follow up to evaluate their progress depending on the length of time that is required once again by their carrier. I have explained the importance of preoperative weight loss and the benefits regarding lower surgical risk and also assisting the patient in reaching their weight loss goal.  Finally they understand there is a physiologic benefit from the standpoint of hepatic volume reduction preoperatively. I have reiterated the importance of a low carbohydrate and high protein regimen to achieve their stated goal.The patients weight loss goal pre operatively is 5-10 pounds. Smoking Cessation - Today I have counseled the patient extensively regarding smoking cessation. They have been counseled extensively about the detrimental effects of smoking on their weight loss surgical procedure particularly for the gastric bypass and sleeve gastrectomy procedures.   They understand that smoking leads to pulmonary issues postoperatively and can lead to gastric ulcers and marginal ulcers in the post bariatric surgery pouch that has been created. They understand that they must stop smoking prior to surgery or it may affect their ultimate progression to their procedure. Ms. Codi Becerril has a reminder for a \"due or due soon\" health maintenance. I have asked that she contact her primary care provider for follow-up on this health maintenance.       Signed By: Kaci Pratt MD     November 30, 2020

## 2020-12-15 ENCOUNTER — CLINICAL SUPPORT (OUTPATIENT)
Dept: SURGERY | Age: 30
End: 2020-12-15

## 2020-12-15 VITALS — BODY MASS INDEX: 45.48 KG/M2 | HEIGHT: 65 IN | WEIGHT: 273 LBS

## 2020-12-15 DIAGNOSIS — E66.01 MORBID OBESITY (HCC): Primary | ICD-10-CM

## 2020-12-15 NOTE — PROGRESS NOTES
Medical Weight Loss Multi-Disciplinary Program    Name: Glenn Gagnon   : 1990    Session# 4  Date: 12/15/2020    Visit Vitals  Ht 5' 5\" (1.651 m)   Wt 123.8 kg (273 lb)   BMI 45.43 kg/m²       Dietary Instructions    Reviewed intake  Understanding low carbohydrates, low sugar, higher protein meals  Instruction given for personal dietary changes  Discussed perceived compliance  Comments: Diet hx reviewed and personal dietary changes discussed. Reviewed recommendation to follow 6096-4035 calorie diet, working to reduce total carbohydrate intake to  g or less per day and increasing protein intake to  g per day, compared current intake to recommendations.    -Patient is focusing on eating 3 meals per day  - She is focusing on increasing water and decreasing sugar sweetened beverages and juice   Today the majority of our visit was spent reviewing patient's food recall and identifying areas for improvement. Educated  on the importance of eating 3 meals/day at regularly scheduled times including breakfast within 1st 1-2 hours of waking. Suggested patient use a protein supplement as a meal replacement instead of skipping OR as a between meal high protein snack. Also educated on the importance of including a protein with every meal and snack and reviewed lean sources. Reinforced tips and recommendations for high protein, low carbohydrate meal planning. Today we spent majority of session reviewing key diet principles and beginning to discuss post operative diet advancement guidelines. Reinforcing the importance of adequate hydration and protein intake - emphasizing the role of protein supplements in the post operative diet. Discussed vitamin and mineral supplementation forever following surgery. Encouraged patient to review key diet principles of surgery and we will discuss individual questions or concerns.      Typical intake is as follows:  Breakfast: 2 boiled egg and 1 boiled turkey sausage- Still doing sugar sweetened coffee -1-2 days weeek   Lunch: Fruit (peaches OR 1 banana OR bag of grapes) - Plain vanilla yogurt cup   Dinner: Grilled chicken with broccoli OR Lettuce with turkey burger and french fries   Snacks: SF jello cups  Fluids: 5-6 bottles of water        Nutritional Hx: What is the number of meals you eat per day? 3  Comment: working to avoid meal skipping    Do you eat between meals / snack? no  Typical snack: How fast do you eat your meals? slow    How often do you eat fast food? 1 times a week    How many sodas/sugared beverages do you drink per day? Does have sweetened iced coffee     How many caffeinated drinks do you have per day? Iced coffee - not daily    How much milk and/or juice do you drink per day? Does report having juice when she was sick, but working to eliminate     How much water do you drink per day? 10-12 (8oz glasses)     How often do you consume alcohol? Rarely, social occasions     Current Vitamins: Women's MVI         Reviewed intake  Understanding low carbohydrates, low sugar, higher protein meals  Understanding proper portions  Dining outside home  Instruction given for personal dietary changes  Discussed perceived compliance  Comments: Pt given brief pre/post-op diet ed and diet hx reviewed. Patient Education and Materials Provided:  Supplement [facility], B Vitamin Information, MVI Recommendations, Calcium Citrate Information, Bariatric Supplement Companies, Protein Supplement Information, Fluid Requirements, No Caffeine or Carbonation, No Alcohol for One Year Post Op, 3 Balanced Meals a Day, Food Group Guide, Good Choices Dining Out, No Snacks, No Concentrated Sweets, Support System at Home, Exercising, Support Group Information and Addressed Current Habits / Changes to make  Physical Activity/Exercise    Discussed Perceived Compliance  Reasonable Goals Set  Motivation  Comments: none    Exercise:  Do you currently have an exercise routine?  No - but does try to walk a trail near her house 2-3 times per week     Goals:   1. Work to increase to 3-4 small meals per day, with planned snacks as needed. Recommend following plate method for meal planning - focusing on lean protein, non-starchy vegetables, and measured amounts of starch. - Goal of  g protein and  g carbohydrate per day. - Recommend continuing protein supplement as meal replacement at least 1x/day  2. Increase non caloric fluid to 64 oz per day. Eliminate caffeine, added sugar, carbonation, and straws.               -Continue to work to decrease sugar sweetened beverages - goal of calorie free beverages only              -Must eliminate caffeine prior to surgery and avoid for ~6-8 weeks  3. Start activity regimen, work to increase ADL              -Try to start walking for at least 30-60  minutes 5 days per week  4. Continue MVI       Candidate for surgery (per RD):  PENDING  Toña Dill RD

## 2020-12-30 ENCOUNTER — VIRTUAL VISIT (OUTPATIENT)
Dept: SURGERY | Age: 30
End: 2020-12-30
Payer: MEDICAID

## 2020-12-30 VITALS — HEIGHT: 65 IN | WEIGHT: 273 LBS | BODY MASS INDEX: 45.48 KG/M2

## 2020-12-30 DIAGNOSIS — E66.01 MORBID OBESITY WITH BMI OF 45.0-49.9, ADULT (HCC): ICD-10-CM

## 2020-12-30 DIAGNOSIS — E66.01 MORBID OBESITY (HCC): Primary | ICD-10-CM

## 2020-12-30 DIAGNOSIS — F17.200 SMOKER: ICD-10-CM

## 2020-12-30 DIAGNOSIS — M25.569 KNEE PAIN, UNSPECIFIED CHRONICITY, UNSPECIFIED LATERALITY: ICD-10-CM

## 2020-12-30 PROCEDURE — 99214 OFFICE O/P EST MOD 30 MIN: CPT | Performed by: NURSE PRACTITIONER

## 2020-12-30 NOTE — PROGRESS NOTES
Bariatric Surgery Consultation    Subjective:     Eugenia Rubi is a 35 y.o. obese female with a Body mass index is 45.43 kg/m². Vincent Rubi desires surgery at this time because of health issues and quality of life issues. Eugenia Rubi has been seen by a bariatric nutritionist and has been placed on an appropriate low carbohydrate diet. The patient desires laparoscopic gastric bypass surgery for surgical weight loss, however she is not currently a surgical candidate due to pending work up. Eugenia Rubi is here today to check progress with weight loss / evaluate nutritional status and review all subspecialty clearances in hopes of proceeding to the operating room. Office visit notes from August 2020 to present have been reviewed. Eugenia Rubi has increased fluid intake, is focusing on protein, is eating regularly, is taking a multivitamin & has increased her activity. No recent visits with PCP. No new medications. UGI done 10/21/20 showed normal anatomy    Has cardiac clearance follow-up today for stress test and echo results. Has appt with Dr Stephie Pearl for sleep study results on 1/15/21. Down to one cigarette a day and has 100% quit date scheduled for 1/1/21. Patient Active Problem List    Diagnosis Date Noted    Morbid obesity ([facility] [address] 08/25/2020    Morbid obesity with BMI of 45.0-49.9, adult ([facility] [address] 08/25/2020    Smoker 08/25/2020    Back pain 08/25/2020    Knee pain 08/25/2020    Anemia     Irregular menses       No past surgical history on file. Social History     Tobacco Use    Smoking status: Never Smoker    Smokeless tobacco: Never Used    Tobacco comment: 1 black and mild a week   Substance Use Topics    Alcohol use: Never     Frequency: Never      Family History   Problem Relation Age of Onset    No Known Problems Mother       Current Outpatient Medications   Medication Sig Dispense Refill    multivitamin (ONE A DAY) tablet Take 1 Tab by mouth daily. No Known Allergies       Review of Systems:        General - No history or complaints of unexpected fever, chills, or weight loss  Head/Neck - No history or complaints of headache, diplopia, dysphagia, hearing loss  Cardiac - No history or complaints of chest pain, palpitations, murmur, or shortness of breath  Pulmonary - No history or complaints of shortness of breath, productive cough, hemoptysis  Gastrointestinal - rare reflux,  abdominal pain, obstipation/constipation, blood per rectum  Genitourinary - No history or complaints of hematuria/dysuria, stress urinary incontinence symptoms, or renal lithiasis  Musculoskeletal - has joint pain in knees and back, no muscular weakness  Hematologic - No history or complaints of bleeding disorders, blood transfusions, sickle cell anemia  Neurologic - No history or complaints of  migraine headaches, seizure activity, syncopal episodes, TIA or stroke  Integumentary - No history or complaints of rashes, abnormal nevi, skin cancer  Gynecological - Irregualr menses with occ heavy bleeding    Objective:     Visit Vitals   5' 5\" (1.651 m)   Wt 123.8 kg (273 lb)   BMI 45.43 kg/m²       Physical Exam:    General appearance - well appearing and in no distress  Mental status - alert, oriented to person, place, and time  Pulmonary - normal respiratory effort  Abdomen - no obvious distention  Neurological - normal speech, no focal findings or movement disorder noted  Extremities - normal movement  Musculoskeletal - moving extremities without difficulty  Skin - no rashes, no suspicious skin lesions noted      Labs:     No results found for this or any previous visit (from the past 2016 hour(s)). Assessment:     Morbid obesity with associated comorbidity, severe central obesity & outstanding insurance requirements. Plan: To continue current medications & routine follow-up with PCP.     Continuation of Pre-Operative evaluation / clearance:  Kd Marie has returned to the office today to discuss her status as a surgical candidate. Progress has been noted and reviewed - including review of notes from dietician. Hermes Rose is being compliant with follow-up & recommendations. Hermes Rose has 3-8 more pounds to lose before proceeding to the OR. (6 pounds lost since last visit)  Hermes Rose has  2 more nutritional visits to complete before proceeding to the OR. Additionally, 1 more medical visits are required. Hermes Rose has an outstanding cardiac, pulmonary and PCP clearance to review before proceeding to the OR. Hermes Rose will return in 1 month to continue the pre-operative process and to work towards goals as outlined. Hermes Rose understand the rationales for all the above and plans to follow the diet & activity recommendations of the dietician. It has been discussed that given her morbidly obese condition that the best surgical option for this patient would be the laparoscopic gastric bypass surgery. Hermes Rose agrees with the surgical choice and has been educated in it's; risks, benefits, and alternatives. We will continue with the pre-operative evaluation as needed to check progress. Secondary Diagnoses:     Dietary Intervention  - The patient is currently followed by a bariatric nutritionist for an attempt at preoperative weight loss as has been dictated by their insurance carrier. They will be assessed at various times during their follow up to evaluate their progress depending on the length of time that is required once again by their carrier. I have explained the importance of preoperative weight loss and the benefits regarding lower surgical risk and also assisting the patient in reaching their weight loss goal.  Finally they understand there is a physiologic benefit from the standpoint of hepatic volume reduction preoperatively.   I have reiterated the importance of a low carbohydrate and high protein regimen to achieve their stated goal.The patients weight loss goal pre operatively is 5-10 pounds. Smoking Cessation - Today I have counseled the patient extensively regarding smoking cessation. They have been counseled extensively about the detrimental effects of smoking on their weight loss surgical procedure particularly for the gastric bypass and sleeve gastrectomy procedures. They understand that smoking leads to pulmonary issues postoperatively and can lead to gastric ulcers and marginal ulcers in the post bariatric surgery pouch that has been created. They understand that they must stop smoking prior to surgery or it may affect their ultimate progression to their procedure. Ms. Severa Foerster has a reminder for a \"due or due soon\" health maintenance. I have asked that she contact her primary care provider for follow-up on this health maintenance. This visit with Ms Severa Foerster was performed under virtual telemedicine guidelines during the coronavirus (ZFION-37) public health emergency on 12/30/2020 in an interactive fashion using [employer]. me. They understand that this telemedicine encounter is a billable service, with coverage determined by their insurance carrier. They are aware that they may receive a bill and have provided verbal consent for this virtual visit. This visit was performed with the patient in their home environment and provider was present at [facility]. I have spent over 30 minutes on this visit  both prior to the visit reviewing the patients chart and with the patient face to face. I have reviewed their medical history, performed a telemedicine physical examination, and discussed the plan of action to date. They understand that they will be asked to come to the office when our office is allowed normal patient interaction, as dictated by public health officials, for a face-to-face visit to rediscuss all of the things we have talked about today.         Signed By: Chanel Salazar NP December 30, 2020

## 2020-12-30 NOTE — PROGRESS NOTES
Spoke with pt is has a virtual appt with Sudeep Hooper this morning. Pt stated that she is doing well.

## 2020-12-30 NOTE — PATIENT INSTRUCTIONS
Supplement Resource Guide Importance of Protein:  
Maintains lean body mass, produces antibodies to fight off infections, heals wounds, minimizes hair loss, helps to give you energy, helps with satiety, and keeping you full between meals. Importance of Calcium: 
Needed for healthy bones and teeth, normal blood clotting, and nervous system functioning, higher risk of osteoporosis and bone disease with non-compliance. Importance of Multivitamins: Many functions. Supply you with extra nutrients that you may be missing from food. May lead to iron deficiency anemia, weakness, fatigue, and many other symptoms with non-compliance. Importance of B Vitamins: 
Important for red blood cell formation, metabolism, energy, and helps to maintain a healthy nervous system. Protein Supplement Find one you like now. Use immediately after surgery. Look for: 
35-50g protein each day from your protein supplement once you reach the progression diet. 0-3 g fat per serving 0-3 g sugar per serving Protein drinks should be split in separate dosages. Recommend: Lifelong 1 year + Calcium Supplement:  
 
Start taking within a month after surgery. Look for: Calcium Citrate Plus D (1500 mg per day) Recommend: Citracal 
 
 . Avoid chocolate chewable calcium. Can use chewable bariatric or [employer] brand or similar chewable. The body cannot absorb more than 500-600 mg of calcium at a time. Take for Life Multi-vitamin Supplement:   
 
Start immediately after surgery: any complete chewable, such as: [place]s Complete chewables. Avoid [place] sours or gummies. They lack iron and other important nutrients and also have added sugar. Continue with chewable vitamin or change to adult complete multivitamin one month after surgery. Menstruating women can take a prenatal vitamin. Make sure has at least 18 mg iron and 104-559 mcg folic acid Vitamin B12, B Complex Vitamin, and Biotin Start taking within a month after surgery. Vitamin B12:  1000 mcg of Vitamin B12 three times weekly Must take sublingually (meaning you take it under your tongue) or in a liquid drop form for easy absorption. B Complex Vitamin: Take a pill or liquid drop form once daily. Biotin: This vitamin can help prevent hair loss. Recommend 5mg  
(5000 mcg) a day Biotin is Optional  
 
 
 
 
  
Learning About Physical Activity What is physical activity? Physical activity is any kind of activity that gets your body moving. The types of physical activity that can help you get fit and stay healthy include: · Aerobic or \"cardio\" activities that make your heart beat faster and make you breathe harder, such as brisk walking, riding a bike, or running. Aerobic activities strengthen your heart and lungs and build up your endurance. · Strength training activities that make your muscles work against, or \"resist,\" something, such as lifting weights or doing push-ups. These activities help tone and strengthen your muscles. · Stretches that allow you to move your joints and muscles through their full range of motion. Stretching helps you be more flexible and avoid injury. What are the benefits of physical activity? Being active is one of the best things you can do for your health. It helps you to: · Feel stronger and have more energy to do all the things you like to do. · Focus better at school or work. · Feel, think, and sleep better. · Reach and stay at a healthy weight. · Lose fat and build lean muscle. · Lower your risk for serious health problems. · Keep your bones, muscles, and joints strong. How can you make physical activity part of your life? Get at least 30 minutes of exercise on most days of the week. Walking is a good choice. You also may want to do other activities, such as running, swimming, cycling, or playing tennis or team sports. Pick activities that you likeones that make your heart beat faster, your muscles stronger, and your muscles and joints more flexible. If you find more than one thing you like doing, do them all. You don't have to do the same thing every day. Get your heart pumping every day. Any activity that makes your heart beat faster and keeps it at that rate for a while counts. Here are some great ways to get your heart beating faster: · Go for a brisk walk, run, or bike ride. · Go for a hike or swim. · Go in-line skating. · Play a game of touch football, basketball, or soccer. · Ride a bike. · Play tennis or racquetball. · Climb stairs. Even some household chores can be aerobicjust do them at a faster pace. Vacuuming, raking or mowing the lawn, sweeping the garage, and washing and waxing the car all can help get your heart rate up. Strengthen your muscles during the week. You don't have to lift heavy weights or grow big, bulky muscles to get stronger. Doing a few simple activities that make your muscles work against, or \"resist,\" something can help you get stronger. For example, you can: · Do push-ups or sit-ups, which use your own body weight as resistance. · Lift weights or dumbbells or use stretch bands at home or in a gym or community center. Stretch your muscles often. Stretching will help you as you become more active. It can help you stay flexible, loosen tight muscles, and avoid injury. It can also help improve your balance and posture and can be a great way to relax. Be sure to stretch the muscles you'll be using when you work out. It's best to warm your muscles slightly before you stretch them. Walk or do some other light aerobic activity for a few minutes, and then start stretching. When you stretch your muscles: · Do it slowly. Stretching is not about going fast or making sudden movements. · Don't push or bounce during a stretch. · Hold each stretch for at least 15 to 30 seconds, if you can. You should feel a stretch in the muscle, but not pain. · Breathe out as you do the stretch. Then breathe in as you hold the stretch. Don't hold your breath. If you're worried about how more activity might affect your health, have a checkup before you start. Follow any special advice your doctor gives you for getting a smart start. Where can you learn more? Go to http://www.gray.com/ Enter T182 in the search box to learn more about \"Learning About Physical Activity. \" Current as of: January 16, 2020               Content Version: 12.6 © 6070-8420 [employer], [employer]. Care instructions adapted under license by [employer] (which disclaims liability or warranty for this information). If you have questions about a medical condition or this instruction, always ask your healthcare professional. [address] any warranty or liability for your use of this information.

## 2021-01-07 ENCOUNTER — CLINICAL SUPPORT (OUTPATIENT)
Dept: SURGERY | Age: 31
End: 2021-01-07

## 2021-01-07 VITALS — BODY MASS INDEX: 45.48 KG/M2 | WEIGHT: 273 LBS | HEIGHT: 65 IN

## 2021-01-07 DIAGNOSIS — E66.01 MORBID OBESITY WITH BMI OF 45.0-49.9, ADULT (HCC): Primary | ICD-10-CM

## 2021-01-07 NOTE — PROGRESS NOTES
Medical Weight Loss Multi-Disciplinary Program    Name: Mauricio Pizano   : 1990    Session# 5 - sent hard copy post-op edu materials   Date: 2021    Visit Vitals  Ht 5' 5\" (1.651 m)   Wt 123.8 kg (273 lb)   BMI 45.43 kg/m²     Dietary Instructions    Reviewed intake  Understanding low carbohydrates, low sugar, higher protein meals  Instruction given for personal dietary changes  Discussed perceived compliance  Comments: Diet hx reviewed and personal dietary changes discussed. Reviewed recommendation to follow 2584-3315 calorie diet, working to reduce total carbohydrate intake to  g or less per day and increasing protein intake to  g per day, compared current intake to recommendations. Today we spent majority of session reviewing key diet principles and beginning to discuss post operative diet advancement guidelines. Reinforcing the importance of adequate hydration and protein intake - emphasizing the role of protein supplements in the post operative diet. Discussed vitamin and mineral supplementation forever following surgery. Encouraged patient to review key diet principles of surgery and we will discuss individual questions or concerns. Patient was receptive to education and we will continue to review and reinforce in our follow-up next month. -Patient is focusing on eating 3 meals per day  - She is focusing on increasing water and decreasing sugar sweetened beverages and juice   - Avoiding bread, still eating fruit but acknowledges that this is a carbohydrate. Typical intake is as follows:  Breakfast: 2 boiled egg and 1 boiled turkey sausage- Still doing sugar sweetened coffee -1-2 days weeek   Lunch: Fruit (peaches OR 1 banana OR bag of grapes) - Plain vanilla yogurt cup   Dinner: Grilled chicken with broccoli OR Lettuce with turkey burger and french fries   Snacks: SF jello cups  Fluids: 5-6 bottles of water      Nutritional Hx:   What is the number of meals you eat per day? 3  Comment: working to avoid meal skipping    Do you eat between meals / snack? no  Typical snack: How fast do you eat your meals? slow    How often do you eat fast food? 1 times a week    How many sodas/sugared beverages do you drink per day? Does have sweetened iced coffee     How many caffeinated drinks do you have per day? Iced coffee - not daily    How much milk and/or juice do you drink per day? Does report having juice when she was sick, but working to eliminate     How much water do you drink per day? 10-12 (8oz glasses)     How often do you consume alcohol? Rarely, social occasions     Current Vitamins: Women's MVI       Reviewed intake  Understanding low carbohydrates, low sugar, higher protein meals  Understanding proper portions  Dining outside home  Instruction given for personal dietary changes  Discussed perceived compliance  Comments: Pt given brief pre/post-op diet ed and diet hx reviewed. Patient Education and Materials Provided:  Supplement [facility], B Vitamin Information, MVI Recommendations, Calcium Citrate Information, Bariatric Supplement Companies, Protein Supplement Information, Fluid Requirements, No Caffeine or Carbonation, No Alcohol for One Year Post Op, 3 Balanced Meals a Day, Food Group Guide, Good Choices Dining Out, No Snacks, No Concentrated Sweets, Support System at Home, Exercising, Support Group Information and Addressed Current Habits / Changes to make  Physical Activity/Exercise    Discussed Perceived Compliance  Reasonable Goals Set  Motivation  Comments: none    Exercise:  Do you currently have an exercise routine? No - but does try to walk a trail near her house 2-3 times per week     Goals:   1. Work to increase to 3-4 small meals per day, with planned snacks as needed. Recommend following plate method for meal planning - focusing on lean protein, non-starchy vegetables, and measured amounts of starch.    - Goal of  g protein and  g carbohydrate per day. - Recommend continuing protein supplement as meal replacement at least 1x/day  2. Increase non caloric fluid to 64 oz per day. Eliminate caffeine, added sugar, carbonation, and straws.               -Continue to work to decrease sugar sweetened beverages - goal of calorie free beverages only              -Must eliminate caffeine prior to surgery and avoid for ~6-8 weeks  3. Start activity regimen, work to increase ADL              -Try to start walking for at least 30-60  minutes 5 days per week  4. Continue MVI       Candidate for surgery (per RD):  PENDING  Mimi Cabrera RD

## 2021-01-28 ENCOUNTER — VIRTUAL VISIT (OUTPATIENT)
Dept: SURGERY | Age: 31
End: 2021-01-28
Payer: MEDICAID

## 2021-01-28 VITALS — WEIGHT: 275 LBS | BODY MASS INDEX: 45.82 KG/M2 | HEIGHT: 65 IN

## 2021-01-28 DIAGNOSIS — E66.01 MORBID OBESITY WITH BMI OF 45.0-49.9, ADULT (HCC): ICD-10-CM

## 2021-01-28 DIAGNOSIS — N92.6 IRREGULAR MENSES: ICD-10-CM

## 2021-01-28 DIAGNOSIS — D64.9 ANEMIA, UNSPECIFIED TYPE: ICD-10-CM

## 2021-01-28 DIAGNOSIS — F17.200 SMOKER: ICD-10-CM

## 2021-01-28 DIAGNOSIS — M25.569 KNEE PAIN, UNSPECIFIED CHRONICITY, UNSPECIFIED LATERALITY: ICD-10-CM

## 2021-01-28 DIAGNOSIS — E66.01 MORBID OBESITY (HCC): Primary | ICD-10-CM

## 2021-01-28 PROCEDURE — 99214 OFFICE O/P EST MOD 30 MIN: CPT | Performed by: NURSE PRACTITIONER

## 2021-01-28 NOTE — PATIENT INSTRUCTIONS
Supplement Resource Guide Importance of Protein:  
Maintains lean body mass, produces antibodies to fight off infections, heals wounds, minimizes hair loss, helps to give you energy, helps with satiety, and keeping you full between meals. Importance of Calcium: 
Needed for healthy bones and teeth, normal blood clotting, and nervous system functioning, higher risk of osteoporosis and bone disease with non-compliance. Importance of Multivitamins: Many functions. Supply you with extra nutrients that you may be missing from food. May lead to iron deficiency anemia, weakness, fatigue, and many other symptoms with non-compliance. Importance of B Vitamins: 
Important for red blood cell formation, metabolism, energy, and helps to maintain a healthy nervous system. Protein Supplement Find one you like now. Use immediately after surgery. Look for: 
35-50g protein each day from your protein supplement once you reach the progression diet. 0-3 g fat per serving 0-3 g sugar per serving Protein drinks should be split in separate dosages. Recommend: Lifelong 1 year + Calcium Supplement:  
 
Start taking within a month after surgery. Look for: Calcium Citrate Plus D (1500 mg per day) Recommend: Citracal 
 
 . Avoid chocolate chewable calcium. Can use chewable bariatric or [employer] brand or similar chewable. The body cannot absorb more than 500-600 mg of calcium at a time. Take for Life Multi-vitamin Supplement:   
 
Start immediately after surgery: any complete chewable, such as: [place]s Complete chewables. Avoid [place] sours or gummies. They lack iron and other important nutrients and also have added sugar. Continue with chewable vitamin or change to adult complete multivitamin one month after surgery. Menstruating women can take a prenatal vitamin. Make sure has at least 18 mg iron and 043-578 mcg folic acid Vitamin B12, B Complex Vitamin, and Biotin Start taking within a month after surgery. Vitamin B12:  1000 mcg of Vitamin B12 three times weekly Must take sublingually (meaning you take it under your tongue) or in a liquid drop form for easy absorption. B Complex Vitamin: Take a pill or liquid drop form once daily. Biotin: This vitamin can help prevent hair loss. Recommend 5mg  
(5000 mcg) a day Biotin is Optional  
 
 
 
 
  
Learning About Physical Activity What is physical activity? Physical activity is any kind of activity that gets your body moving. The types of physical activity that can help you get fit and stay healthy include: · Aerobic or \"cardio\" activities that make your heart beat faster and make you breathe harder, such as brisk walking, riding a bike, or running. Aerobic activities strengthen your heart and lungs and build up your endurance. · Strength training activities that make your muscles work against, or \"resist,\" something, such as lifting weights or doing push-ups. These activities help tone and strengthen your muscles. · Stretches that allow you to move your joints and muscles through their full range of motion. Stretching helps you be more flexible and avoid injury. What are the benefits of physical activity? Being active is one of the best things you can do for your health. It helps you to: · Feel stronger and have more energy to do all the things you like to do. · Focus better at school or work. · Feel, think, and sleep better. · Reach and stay at a healthy weight. · Lose fat and build lean muscle. · Lower your risk for serious health problems. · Keep your bones, muscles, and joints strong. How can you make physical activity part of your life? Get at least 30 minutes of exercise on most days of the week. Walking is a good choice. You also may want to do other activities, such as running, swimming, cycling, or playing tennis or team sports. Pick activities that you likeones that make your heart beat faster, your muscles stronger, and your muscles and joints more flexible. If you find more than one thing you like doing, do them all. You don't have to do the same thing every day. Get your heart pumping every day. Any activity that makes your heart beat faster and keeps it at that rate for a while counts. Here are some great ways to get your heart beating faster: · Go for a brisk walk, run, or bike ride. · Go for a hike or swim. · Go in-line skating. · Play a game of touch football, basketball, or soccer. · Ride a bike. · Play tennis or racquetball. · Climb stairs. Even some household chores can be aerobicjust do them at a faster pace. Vacuuming, raking or mowing the lawn, sweeping the garage, and washing and waxing the car all can help get your heart rate up. Strengthen your muscles during the week. You don't have to lift heavy weights or grow big, bulky muscles to get stronger. Doing a few simple activities that make your muscles work against, or \"resist,\" something can help you get stronger. For example, you can: · Do push-ups or sit-ups, which use your own body weight as resistance. · Lift weights or dumbbells or use stretch bands at home or in a gym or community center. Stretch your muscles often. Stretching will help you as you become more active. It can help you stay flexible, loosen tight muscles, and avoid injury. It can also help improve your balance and posture and can be a great way to relax. Be sure to stretch the muscles you'll be using when you work out. It's best to warm your muscles slightly before you stretch them. Walk or do some other light aerobic activity for a few minutes, and then start stretching. When you stretch your muscles: · Do it slowly. Stretching is not about going fast or making sudden movements. · Don't push or bounce during a stretch. · Hold each stretch for at least 15 to 30 seconds, if you can. You should feel a stretch in the muscle, but not pain. · Breathe out as you do the stretch. Then breathe in as you hold the stretch. Don't hold your breath. If you're worried about how more activity might affect your health, have a checkup before you start. Follow any special advice your doctor gives you for getting a smart start. Where can you learn more? Go to http://www.gray.com/ Enter R417 in the search box to learn more about \"Learning About Physical Activity. \" Current as of: January 16, 2020               Content Version: 12.6 © 7812-1612 [employer], [employer]. Care instructions adapted under license by [email or web address] (which disclaims liability or warranty for this information). If you have questions about a medical condition or this instruction, always ask your healthcare professional. [address] any warranty or liability for your use of this information.

## 2021-01-28 NOTE — PROGRESS NOTES
Bariatric Surgery Consultation    Subjective:     Nelda Buerger is a 35 y.o. obese female with a Body mass index is 45.76 kg/m². [facility] Nelda Buerger desires surgery at this time because of health issues and quality of life issues. Nelda Buerger has been seen by a bariatric nutritionist and has been placed on an appropriate low carbohydrate diet. The patient desires laparoscopic gastric bypass surgery for surgical weight loss, however she is not currently a surgical candidate due to pending work up. Nelda Buerger is here today to check progress with weight loss / evaluate nutritional status and review all subspecialty clearances in hopes of proceeding to the operating room. Office visit notes from August 2020 to present have been reviewed. Nelda Buerger has increased fluid intake, is focusing on protein, is eating regularly, is taking a multivitamin & has increased her activity. No recent visits with PCP. No new medications. UGI done 10/21/20 showed normal anatomy     Had cardiac clearance for stress test and echo results.     Had appt with Dr Lia Luevano for normal sleep study results.     Last cigarette was first week of January 2021. Patient Active Problem List    Diagnosis Date Noted    Morbid obesity ([facility] [address] 08/25/2020    Morbid obesity with BMI of 45.0-49.9, adult ([facility] [address] 08/25/2020    Smoker 08/25/2020    Back pain 08/25/2020    Knee pain 08/25/2020    Anemia     Irregular menses       No past surgical history on file. Social History     Tobacco Use    Smoking status: Never Smoker    Smokeless tobacco: Never Used    Tobacco comment: 1 black and mild a week   Substance Use Topics    Alcohol use: Never     Frequency: Never      Family History   Problem Relation Age of Onset    No Known Problems Mother       Current Outpatient Medications   Medication Sig Dispense Refill    multivitamin (ONE A DAY) tablet Take 1 Tab by mouth daily.        No Known Allergies       Review of Systems:        General - No history or complaints of unexpected fever, chills, or weight loss  Head/Neck - No history or complaints of headache, diplopia, dysphagia, hearing loss  Cardiac - No history or complaints of chest pain, palpitations, murmur, or shortness of breath  Pulmonary - No history or complaints of shortness of breath, productive cough, hemoptysis  Gastrointestinal - no reflux,  abdominal pain, obstipation/constipation, blood per rectum  Genitourinary - No history or complaints of hematuria/dysuria, stress urinary incontinence symptoms, or renal lithiasis  Musculoskeletal - has joint pain in left knee, no muscular weakness  Hematologic - No history or complaints of bleeding disorders, blood transfusions, sickle cell anemia  Neurologic - No history or complaints of  migraine headaches, seizure activity, syncopal episodes, TIA or stroke  Integumentary - No history or complaints of rashes, abnormal nevi, skin cancer  Gynecological - Irregular menses with occ heavy menses    Objective:     Visit Vitals  Ht 5' 5\" (1.651 m)   Wt 124.7 kg (275 lb)   BMI 45.76 kg/m²       Physical Exam:    Physical Examination:   General appearance - well appearing and in no distress  Mental status - alert, oriented to person, place, and time  Pulmonary - normal respiratory effort  Abdomen - no obvious distention  Neurological - normal speech, no focal findings or movement disorder noted  Extremities - normal movement  Musculoskeletal - moving extremities without difficulty  Skin - no rashes, no suspicious skin lesions noted      Labs:     No results found for this or any previous visit (from the past 2016 hour(s)). Labs reviewed from 11/23/20:  TSH 3.10  Crt 0.60  LFTs WNL          Assessment:     Morbid obesity with associated comorbidity, severe central obesity & outstanding insurance requirements. Plan: To continue current medications & routine follow-up with PCP.     Continuation of Pre-Operative evaluation / clearance:  Jing Bejarano has returned to the office today to discuss her status as a surgical candidate. Progress has been noted and reviewed - including review of notes from dietician. Jing Bejarano is being compliant with follow-up & recommendations. Jing Bejarano has 5-10 more pounds to lose before proceeding to the OR. (2 pounds gained since last visit)  Jing Bejarano has 1 more nutritional visits to complete before proceeding to the OR. Additionally, 0 more medical visits are required. Jing Bejarano has no outstanding clearance to review before proceeding to the OR. Jing Bejarano will return in 1 month to continue the pre-operative process and to work towards goals as outlined. Jing Bejarano understand the rationales for all the above and plans to follow the diet & activity recommendations of the dietician. It has been discussed that given her morbidly obese condition that the best surgical option for this patient would be the laparoscopic gastric bypass surgery. Jing Bejarano agrees with the surgical choice and has been educated in it's; risks, benefits, and alternatives. We will continue with the pre-operative evaluation as needed to check progress. Secondary Diagnoses:     Dietary Intervention  - The patient is currently followed by a bariatric nutritionist for an attempt at preoperative weight loss as has been dictated by their insurance carrier. They will be assessed at various times during their follow up to evaluate their progress depending on the length of time that is required once again by their carrier. I have explained the importance of preoperative weight loss and the benefits regarding lower surgical risk and also assisting the patient in reaching their weight loss goal.  Finally they understand there is a physiologic benefit from the standpoint of hepatic volume reduction preoperatively.   I have reiterated the importance of a low carbohydrate and high protein regimen to achieve their stated goal.The patients weight loss goal pre operatively is 5-10 pounds. This visit with Ms Sahri Ken was performed under virtual telemedicine guidelines during the coronavirus (BARQY-58) public health emergency on 1/28/2021 in an interactive fashion using Doxy. me. Rico Cook understand that this telemedicine encounter is a billable service, with coverage determined by their insurance carrier. Rico Cook are aware that they may receive a bill and have provided verbal consent for this virtual visit. This visit was performed with the patient in their home environment and provider was present at [facility]. I have spent over 30 minutes on this visit  both prior to the visit reviewing the patients chart and with the patient face to face. I have reviewed their medical history, performed a telemedicine physical examination, and discussed the plan of action to date. Rico Cook understand that they will be asked to come to the office when our office is allowed normal patient interaction, as dictated by public health officials, for a face-to-face visit to rediscuss all of the things we have talked about today.      Ms. Shari Ken has a reminder for a \"due or due soon\" health maintenance. I have asked that she contact her primary care provider for follow-up on this health maintenance.       Signed By: Arnulfo Blue NP     January 28, 2021

## 2021-02-03 ENCOUNTER — HOSPITAL ENCOUNTER (OUTPATIENT)
Dept: LAB | Age: 31
Discharge: HOME | End: 2021-02-03

## 2021-02-03 DIAGNOSIS — K30 FUNCTIONAL DYSPEPSIA: ICD-10-CM

## 2021-02-03 DIAGNOSIS — N92.6 IRREGULAR MENSES: ICD-10-CM

## 2021-02-03 DIAGNOSIS — M25.569 KNEE PAIN, UNSPECIFIED CHRONICITY, UNSPECIFIED LATERALITY: ICD-10-CM

## 2021-02-03 DIAGNOSIS — E66.01 MORBID OBESITY (HCC): ICD-10-CM

## 2021-02-03 DIAGNOSIS — M54.9 BACK PAIN, UNSPECIFIED BACK LOCATION, UNSPECIFIED BACK PAIN LATERALITY, UNSPECIFIED CHRONICITY: ICD-10-CM

## 2021-02-03 DIAGNOSIS — F17.200 SMOKER: ICD-10-CM

## 2021-02-03 DIAGNOSIS — E66.01 MORBID OBESITY WITH BMI OF 45.0-49.9, ADULT (HCC): ICD-10-CM

## 2021-02-03 DIAGNOSIS — D64.9 ANEMIA, UNSPECIFIED TYPE: ICD-10-CM

## 2021-02-03 LAB — SENTARA SPECIMEN COL,SENBCF: NORMAL

## 2021-02-03 PROCEDURE — 99001 SPECIMEN HANDLING PT-LAB: CPT

## 2021-02-05 ENCOUNTER — CLINICAL SUPPORT (OUTPATIENT)
Dept: SURGERY | Age: 31
End: 2021-02-05

## 2021-02-05 VITALS — HEIGHT: 65 IN | WEIGHT: 273 LBS | BODY MASS INDEX: 45.48 KG/M2

## 2021-02-05 DIAGNOSIS — E66.01 MORBID OBESITY WITH BMI OF 45.0-49.9, ADULT (HCC): Primary | ICD-10-CM

## 2021-02-05 NOTE — PROGRESS NOTES
Medical Weight Loss Multi-Disciplinary Program    Name: Rivera Henderson   : 1990    Session# 6 -   Date: 2021    Visit Vitals  Ht 5' 5\" (1.651 m)   Wt 123.8 kg (273 lb)   BMI 45.43 kg/m²   The patients weight loss goal pre operatively is 5-10 pounds. Start Weight: 275 lbs  Dietary Instructions        Reviewed intake  Understanding low carbohydrates, low sugar, higher protein meals  Instruction given for personal dietary changes  Discussed perceived compliance  Comments: Diet hx reviewed and personal dietary changes discussed. Reviewed recommendation to follow [id number] calorie diet, working to reduce total carbohydrate intake to  g or less per day and increasing protein intake to  g per day, compared current intake to recommendations. Today we spent majority of session reviewing key diet principles and beginning to discuss post operative diet advancement guidelines. Reinforcing the importance of adequate hydration and protein intake - emphasizing the role of protein supplements in the post operative diet. Discussed vitamin and mineral supplementation forever following surgery. Encouraged patient to review key diet principles of surgery and we will discuss individual questions or concerns. Patient was receptive to education.    -Patient is focusing on eating 3 meals per day  - She is focusing on increasing water and decreasing sugar sweetened beverages and juice   - Avoiding bread, still eating fruit but acknowledges that this is a carbohydrate. Typical intake is as follows:  Breakfast: 2 boiled egg and 1 boiled turkey sausage- Still doing sugar sweetened coffee -1-2 days weeek   Lunch: Fruit (peaches OR 1 banana OR bag of grapes) - Plain vanilla yogurt cup   Dinner: Grilled chicken with broccoli OR Lettuce with turkey burger and french fries   Snacks: SF jello cups  Fluids: 5-6 bottles of water      Nutritional Hx: What is the number of meals you eat per day?  3  Comment: working to avoid meal skipping    Do you eat between meals / snack? no  Typical snack: How fast do you eat your meals? slow    How often do you eat fast food? 1 times a week    How many sodas/sugared beverages do you drink per day? Does have sweetened iced coffee     How many caffeinated drinks do you have per day? Iced coffee - not daily    How much milk and/or juice do you drink per day? Does report having juice when she was sick, but working to eliminate     How much water do you drink per day? 10-12 (8oz glasses)     How often do you consume alcohol? Rarely, social occasions     Current Vitamins: Women's MVI       Reviewed intake  Understanding low carbohydrates, low sugar, higher protein meals  Understanding proper portions  Dining outside home  Instruction given for personal dietary changes  Discussed perceived compliance  Comments: Pt given brief pre/post-op diet ed and diet hx reviewed. Patient Education and Materials Provided:  Supplement [facility], B Vitamin Information, MVI Recommendations, Calcium Citrate Information, Bariatric Supplement Companies, Protein Supplement Information, Fluid Requirements, No Caffeine or Carbonation, No Alcohol for One Year Post Op, 3 Balanced Meals a Day, Food Group Guide, Good Choices Dining Out, No Snacks, No Concentrated Sweets, Support System at Home, Exercising, Support Group Information and Addressed Current Habits / Changes to make  Physical Activity/Exercise    Discussed Perceived Compliance  Reasonable Goals Set  Motivation  Comments: none    Exercise:  Do you currently have an exercise routine? No - but does try to walk a trail near her house 2-3 times per week     Goals:   1. Work to increase to 3-4 small meals per day, with planned snacks as needed. Recommend following plate method for meal planning - focusing on lean protein, non-starchy vegetables, and measured amounts of starch. - Goal of  g protein and  g carbohydrate per day. - Recommend continuing protein supplement as meal replacement at least 1x/day  2. Increase non caloric fluid to 64 oz per day. Eliminate caffeine, added sugar, carbonation, and straws.               -Continue to work to decrease sugar sweetened beverages - goal of calorie free beverages only              -Must eliminate caffeine prior to surgery and avoid for ~6-8 weeks  3. Start activity regimen, work to increase ADL              -Try to start walking for at least 30-60  minutes 5 days per week  4.  Continue MVI       Candidate for surgery (per RD): YES  Mimi Cabrera RD

## 2021-02-15 ENCOUNTER — TELEPHONE (OUTPATIENT)
Dept: SURGERY | Age: 31
End: 2021-02-15

## 2021-02-15 RX ORDER — OMEPRAZOLE 20 MG/1
20 CAPSULE, DELAYED RELEASE ORAL
Qty: 28 CAP | Refills: 0 | Status: SHIPPED | OUTPATIENT
Start: 2021-02-15 | End: 2021-03-01

## 2021-02-15 RX ORDER — AMOXICILLIN 500 MG/1
1000 CAPSULE ORAL 2 TIMES DAILY
Qty: 56 CAP | Refills: 0 | Status: SHIPPED | OUTPATIENT
Start: 2021-02-15 | End: 2021-03-01

## 2021-02-15 RX ORDER — CLARITHROMYCIN 500 MG/1
500 TABLET, FILM COATED ORAL 2 TIMES DAILY
Qty: 28 TAB | Refills: 0 | Status: SHIPPED | OUTPATIENT
Start: 2021-02-15 | End: 2021-03-01

## 2021-02-15 NOTE — TELEPHONE ENCOUNTER
Spoke with patient about positive h pylori antibodies. Sent triple therapy to pharmacy on file.     Other labs results scanned under media tab from 2/3/21:  Hgb 12.7  Iron 38  Ferritin 135

## 2021-04-01 DIAGNOSIS — E66.01 MORBID OBESITY (HCC): Primary | ICD-10-CM

## 2021-04-01 DIAGNOSIS — Z01.812 BLOOD TESTS PRIOR TO TREATMENT OR PROCEDURE: ICD-10-CM

## 2021-04-05 ENCOUNTER — HOSPITAL ENCOUNTER (OUTPATIENT)
Dept: LAB | Age: 31
Discharge: HOME | End: 2021-04-05

## 2021-04-05 ENCOUNTER — HOSPITAL ENCOUNTER (OUTPATIENT)
Dept: PREADMISSION TESTING | Age: 31
Discharge: HOME | End: 2021-04-05
Payer: MEDICAID

## 2021-04-05 ENCOUNTER — TELEPHONE (OUTPATIENT)
Dept: SURGERY | Age: 31
End: 2021-04-05

## 2021-04-05 ENCOUNTER — OFFICE VISIT (OUTPATIENT)
Dept: SURGERY | Age: 31
End: 2021-04-05
Payer: MEDICAID

## 2021-04-05 VITALS
OXYGEN SATURATION: 100 % | TEMPERATURE: 97.8 F | WEIGHT: 278.56 LBS | SYSTOLIC BLOOD PRESSURE: 130 MMHG | BODY MASS INDEX: 46.41 KG/M2 | HEART RATE: 76 BPM | RESPIRATION RATE: 16 BRPM | DIASTOLIC BLOOD PRESSURE: 72 MMHG | HEIGHT: 65 IN

## 2021-04-05 DIAGNOSIS — E66.01 MORBID OBESITY (HCC): ICD-10-CM

## 2021-04-05 DIAGNOSIS — Z01.812 BLOOD TESTS PRIOR TO TREATMENT OR PROCEDURE: ICD-10-CM

## 2021-04-05 DIAGNOSIS — E66.01 MORBID OBESITY WITH BMI OF 45.0-49.9, ADULT (HCC): ICD-10-CM

## 2021-04-05 DIAGNOSIS — M54.9 BACK PAIN, UNSPECIFIED BACK LOCATION, UNSPECIFIED BACK PAIN LATERALITY, UNSPECIFIED CHRONICITY: ICD-10-CM

## 2021-04-05 DIAGNOSIS — Z87.891 FORMER SMOKER: Primary | ICD-10-CM

## 2021-04-05 LAB
ABO + RH BLD: NORMAL
BLOOD GROUP ANTIBODIES SERPL: NORMAL
SENTARA SPECIMEN COL,SENBCF: NORMAL
SPECIMEN EXP DATE BLD: NORMAL

## 2021-04-05 PROCEDURE — 99001 SPECIMEN HANDLING PT-LAB: CPT

## 2021-04-05 PROCEDURE — 86850 RBC ANTIBODY SCREEN: CPT

## 2021-04-05 PROCEDURE — 93005 ELECTROCARDIOGRAM TRACING: CPT

## 2021-04-05 PROCEDURE — 99215 OFFICE O/P EST HI 40 MIN: CPT | Performed by: SPECIALIST

## 2021-04-05 RX ORDER — ENOXAPARIN SODIUM 100 MG/ML
40 INJECTION SUBCUTANEOUS EVERY 12 HOURS
Qty: 14 SYRINGE | Refills: 0 | Status: ON HOLD | OUTPATIENT
Start: 2021-04-05 | End: 2021-04-14

## 2021-04-05 NOTE — PROGRESS NOTES
Gastric Bypass - History and Physical    Subjective: The patient is a 28 y.o. obese female with a Body mass index is 46.36 kg/m². .   she presents now to review their work up to date to see if they are a candidate for surgery and whether or not to proceed with the previously requested procedure. Bariatric comorbidities continue to include:   Patient Active Problem List   Diagnosis Code    [facility] ([facility] E66.01    Morbid obesity with BMI of 45.0-49.9, adult ([facility] E66.01, Z68.42    Anemia D64.9    Irregular menses N92.6    Smoker F17.200    Back pain M54.9    Knee pain M25.569    Former smoker Z87.891       They have been generally well prior to this visit and have had no recent significant illnesses. The patient has had no gastrointestinal issues that would preclude them from proceeding with the surgery they have chosen. Malorie Dale has recently tried a preoperative weight loss program  in addition to seeing a bariatric nutritionist preoperatively. We have discussed on at least one other occasion about the various types of surgical weight loss procedures and they have considered these options after our initial consultation. We have once again discussed these procedures in detail and they have now decided on a surgical procedure. They present today to discuss this and confirm that their evaluation pre operatively is acceptable to continue with surgery. The patient desires laparoscopic gastric bypass surgery for surgical weight loss. The patients goal weight is 150lb. These goals are consistent with expected outcomes of their desired operation. her Medical goals are resolution of these health issues.     Patient Active Problem List    Diagnosis Date Noted    Former smoker     Morbid obesity ([facility] 08/25/2020    Morbid obesity with BMI of 45.0-49.9, adult ([facility] 08/25/2020    Smoker 08/25/2020    Back pain 08/25/2020    Knee pain 08/25/2020    Anemia     Irregular menses       No past surgical history on file. Social History     Tobacco Use    Smoking status: Former Smoker     Types: Cigarettes     Quit date: 2021     Years since quittin.2    Smokeless tobacco: Never Used    Tobacco comment: 1 black and mild a week   Substance Use Topics    Alcohol use: Never     Frequency: Never      Family History   Problem Relation Age of Onset    No Known Problems Mother       Current Outpatient Medications   Medication Sig Dispense Refill    PNV Comb #2-Iron-FA-Omega 3 29-1-400 mg cmpk Take  by mouth.  enoxaparin (LOVENOX) 40 mg/0.4 mL 0.4 mL by SubCUTAneous route every twelve (12) hours every twelve (12) hours for 7 days. Indications: deep vein thrombosis prevention 14 Syringe 0    multivitamin (ONE A DAY) tablet Take 1 Tab by mouth daily.        No Known Allergies       Review of Systems:          General - No history or complaints of unexpected fever, chills, or weight loss  Head/Neck - No history or complaints of headache, diplopia, dysphagia, hearing loss  Cardiac - No history or complaints of chest pain, palpitations, murmur, or shortness of breath  Pulmonary - No history or complaints of shortness of breath, productive cough, hemoptysis  Gastrointestinal - mild reflux,no  abdominal pain, obstipation/constipation or blood per rectum  Genitourinary - No history or complaints of hematuria/dysuria, stress urinary incontinence symptoms, or renal lithiasis  Musculoskeletal - mild joint pain ,  no muscular weakness  Hematologic - No history or complaints of bleeding disorders,  No blood transfusions  Neurologic - No history or complaints of  migraine headaches, seizure activity, syncopal episodes, TIA or stroke  Integumentary - No history or complaints of rashes, abnormal nevi, skin cancer  Gynecological - normal menses             Objective:     Visit Vitals  /72 (BP 1 Location: Left arm, BP Patient Position: Sitting, BP Cuff Size: Adult)   Pulse 76   Temp 97.8 °F (36.6 °C)   Resp 16    5' 5\" (1.651 m)   Wt 126.4 kg (278 lb 9 oz)   SpO2 100%   BMI 46.36 kg/m²       Physical Examination: General appearance - alert, well appearing, and in no distress and oriented to person, place, and time  Mental status - alert, oriented to person, place, and time, normal mood, behavior, speech, dress, motor activity, and thought processes  Eyes - pupils equal and reactive, extraocular eye movements intact, sclera anicteric, left eye normal, right eye normal  Ears - right ear normal, left ear normal  Nose - normal and patent, no erythema, discharge or polyps  Mouth - mucous membranes moist, pharynx normal without lesions  Neck - supple, no significant adenopathy  Lymphatics - no palpable lymphadenopathy, no hepatosplenomegaly  Chest - clear to auscultation, no wheezes, rales or rhonchi, symmetric air entry  Heart - normal rate, regular rhythm, normal S1, S2, no murmurs, rubs, clicks or gallops  Abdomen - soft, nontender, nondistended, no masses or organomegaly  Back exam - full range of motion, no tenderness, palpable spasm or pain on motion  Neurological - alert, oriented, normal speech, no focal findings or movement disorder noted  Musculoskeletal - no joint tenderness, deformity or swelling  Extremities - peripheral pulses normal, no pedal edema, no clubbing or cyanosis  Skin - normal coloration and turgor, no rashes, no suspicious skin lesions noted    Labs :     No results found for: WBC, WBCLT, HGBPOC, HGB, HGBP, HCTPOC, HCT, PHCT, RBCH, PLT, MCV, HGBEXT, HCTEXT, PLTEXT, HGBEXT, HCTEXT, PLTEXT  No results found for: NA, K, CL, CO2, AGAP, GLU, BUN, CREA, BUCR, GFRAA, GFRNA, CA, TBIL, TBILI, AP, TP, ALB, GLOB, AGRAT, ALT  No results found for: IRON, FE, TIBC, IBCT, PSAT, FERR  No results found for: FOL, RBCF  No results found for: Refugio Pronto, VD3RIA            Cardiac / Pulmonary Evaluation:          UGI Results:     KIA Rossi   Physician Assistant   Physician Assistant Procedures      Signed   Date of Service:  10/21/20 1319                    []Hide copied text    []Kassie for details  Royer Doss            : 1990  Medical Record [id number]                 PREPROCEDURE DIAGNOSIS: This patient is preoperative for laparoscopic sleeve gastrectomyprocedure with a history of  reflux disease.     POSTPROCEDURE DIAGNOSIS: This patient is preoperative for laparoscopic sleeve gastrectomyprocedure with a history of  reflux disease.        PROCEDURES PERFORMED: Upper GI study with barium.     ESTIMATED BLOOD LOSS: None.     SPECIMENS: None.     STATEMENT OF MEDICAL NECESSITY: The patient is a patient with a  longstanding history of obesity. They are now considering the laparoscopic sleeve gastrectomyprocedure as a means of surgical weight control and due to their history of reflux disease and are being assessed preoperatively for such.     DESCRIPTION OF PROCEDURE: The patient was brought to the fluoroscopy unit and  was given thin barium. On swallowing of barium, they were noted to have  normal peristalsis of their esophagus. They had prompt filling of distal  esophagus with tapering into the gastroesophageal junction. There was no evidence of a hiatal hernia present. Contrast then filled the gastric cardia, fundus,body and pre pyloric region with no abnormalities noted. Contrast then exited the pylorus in normal fashion. No obstruction was noted. There was no evidence of reflux noted.     (normal anatomy - pt still unsure of surgery choice)               Assessment:     Morbid obesity with associated comorbidity    Plan:     laparoscopic gastric bypass surgery    This is a 28 y.o. female with a BMI of Body mass index is 46.36 kg/m². and the weight-related co-morbidties as noted above. Louann Gleason meets the NIH criteria for bariatric surgery based upon the BMI of Body mass index is 46.36 kg/m². and   multiple weight-related co-morbidties.  Louann Gleason has elected laparoscopic gastric bypass as her intervention of choice for treatment of morbid obestiy through surgical means secondary to its   uniform results,  profound baseline suppression of hunger and pace at which weight is lost.    In the office today, following Kavitha's history and physical examination, a 40 minute discussion regarding the anatomic alterations for the laparoscopic gastric bypass  was undertaken. The dietary   expectations and the patient  dependent factors for success were thoroughly discussed, to include the need for interval follow-up and long-term dietary changes associated with success. The possible short   and long term  complications of the gastric bypass were also discussed, to include but not limited to;death, DVT/PE, staple line leak, bleeding, stricture formation, infection,internal hernia  and pouch dilation. Specific weight related outcomes for success were also discussed with an emphasis on careful and close follow-up with the first year and Dietary behavior modification over the first years as baseline cyclical   hunger returns  The patient expressed an understanding of the above factors, and her questions were answered in their entirety. In addition, the patient attended a 1.5 hour power point seminar or online seminar regarding obesity, surgical weight loss including, adjustable gastric band, gastric bypass, and sleeve gastrectomy. This discussion contrasted   the different surgical techniques, mechanisms of actions and expected outcomes, and surgical and medical risks associated with each procedure. During the in office seminar, there was a long question and answer   session where each questions was answered until there were no additional questions.      Today, the patient had all of her questions answered and the decision was made today that the patient's preoperative evaluation is acceptable for them  to proceed with bariatric surgery  choosing  gastric bypass as her surgical option. Secondary Diagnoses:     DVT / Pulmonary Embolus Risk - The patient is at a higher risk for post operative DVT / pulmonary embolus secondary to their morbid obese status, relative sedentary   lifestyle, and impending general anesthetic. We will plan to use anticoagulation therapy pre and post operative as well as TEDs and  pneumatic compression devices and   encourage ambulation once on the hospital nursing floor. The need for  at home anticoagulation therapy has also been discussed. The patient understands   that their efforts at ambulation are of vital importance to reduce the risk of this complication thus placing significant burden on them as to the prevention of such issues. Signs and symptoms of DVT / PE have been discussed with the patient and they have been instructed to call the office if any these occur in the \"at home\" post op phase. The patient has been provided with a post operative prescription of Lovenox and instructed in its use for DVT prophylaxis.       Signed By: Gabriela Rodriguez MD     April 5, 2021

## 2021-04-05 NOTE — H&P (VIEW-ONLY)
Gastric Bypass - History and Physical 
 
Subjective: The patient is a 28 y.o. obese female with a Body mass index is 46.36 kg/m². .   she presents now to review their work up to date to see if they are a candidate for surgery and whether or not to proceed with the previously requested procedure. Bariatric comorbidities continue to include:  
Patient Active Problem List  
Diagnosis Code  Morbid obesity ([facility] E66.01  
 Morbid obesity with BMI of 45.0-49.9, adult ([facility]) E66.01, Z68.42  
 Anemia D64.9  
 Irregular menses N92.6  Smoker F17.200  Back pain M54.9  Knee pain M25.569 Janine Garcia Former smoker A01.170 They have been generally well prior to this visit and have had no recent significant illnesses. The patient has had no gastrointestinal issues that would preclude them from proceeding with the surgery they have chosen. Haley Rodriguez has recently tried a preoperative weight loss program  in addition to seeing a bariatric nutritionist preoperatively. We have discussed on at least one other occasion about the various types of surgical weight loss procedures and they have considered these options after our initial consultation. We have once again discussed these procedures in detail and they have now decided on a surgical procedure. They present today to discuss this and confirm that their evaluation pre operatively is acceptable to continue with surgery. The patient desires laparoscopic gastric bypass surgery for surgical weight loss. The patients goal weight is 150lb. These goals are consistent with expected outcomes of their desired operation. her Medical goals are resolution of these health issues. Patient Active Problem List  
 Diagnosis Date Noted  Former smoker  [facility] 08/25/2020  Morbid obesity with BMI of 45.0-49.9, adult ([facility] 08/25/2020  Smoker 08/25/2020  Back pain 08/25/2020  Knee pain 08/25/2020  Anemia  Irregular menses No past surgical history on file. Social History Tobacco Use  Smoking status: Former Smoker Types: Cigarettes Quit date: 2021 Years since quittin.2  Smokeless tobacco: Never Used  Tobacco comment: 1 black and mild a week Substance Use Topics  Alcohol use: Never Frequency: Never Family History Problem Relation Age of Onset  No Known Problems Mother Current Outpatient Medications Medication Sig Dispense Refill  PNV Comb #2-Iron-FA-Omega 3 29-1-400 mg cmpk Take  by mouth.  enoxaparin (LOVENOX) 40 mg/0.4 mL 0.4 mL by SubCUTAneous route every twelve (12) hours every twelve (12) hours for 7 days. Indications: deep vein thrombosis prevention 14 Syringe 0  
 multivitamin (ONE A DAY) tablet Take 1 Tab by mouth daily. No Known Allergies Review of Systems:  
  
 
 
General - No history or complaints of unexpected fever, chills, or weight loss Head/Neck - No history or complaints of headache, diplopia, dysphagia, hearing loss Cardiac - No history or complaints of chest pain, palpitations, murmur, or shortness of breath Pulmonary - No history or complaints of shortness of breath, productive cough, hemoptysis Gastrointestinal - mild reflux,no  abdominal pain, obstipation/constipation or blood per rectum Genitourinary - No history or complaints of hematuria/dysuria, stress urinary incontinence symptoms, or renal lithiasis Musculoskeletal - mild joint pain ,  no muscular weakness Hematologic - No history or complaints of bleeding disorders,  No blood transfusions Neurologic - No history or complaints of  migraine headaches, seizure activity, syncopal episodes, TIA or stroke Integumentary - No history or complaints of rashes, abnormal nevi, skin cancer Gynecological - normal menses Objective:  
 
Visit Vitals /72 (BP 1 Location: Left arm, BP Patient Position: Sitting, BP Cuff Size: Adult) Pulse 76 Temp 97.8 °F (36.6 °C) Resp 16  
 5' 5\" (1.651 m) Wt 126.4 kg (278 lb 9 oz) SpO2 100% BMI 46.36 kg/m² Physical Examination: General appearance - alert, well appearing, and in no distress and oriented to person, place, and time Mental status - alert, oriented to person, place, and time, normal mood, behavior, speech, dress, motor activity, and thought processes Eyes - pupils equal and reactive, extraocular eye movements intact, sclera anicteric, left eye normal, right eye normal 
Ears - right ear normal, left ear normal 
Nose - normal and patent, no erythema, discharge or polyps Mouth - mucous membranes moist, pharynx normal without lesions Neck - supple, no significant adenopathy Lymphatics - no palpable lymphadenopathy, no hepatosplenomegaly Chest - clear to auscultation, no wheezes, rales or rhonchi, symmetric air entry Heart - normal rate, regular rhythm, normal S1, S2, no murmurs, rubs, clicks or gallops Abdomen - soft, nontender, nondistended, no masses or organomegaly Back exam - full range of motion, no tenderness, palpable spasm or pain on motion Neurological - alert, oriented, normal speech, no focal findings or movement disorder noted Musculoskeletal - no joint tenderness, deformity or swelling Extremities - peripheral pulses normal, no pedal edema, no clubbing or cyanosis Skin - normal coloration and turgor, no rashes, no suspicious skin lesions noted Labs :  
 
No results found for: WBC, WBCLT, HGBPOC, HGB, HGBP, HCTPOC, HCT, PHCT, RBCH, PLT, MCV, HGBEXT, HCTEXT, PLTEXT, HGBEXT, HCTEXT, PLTEXT No results found for: NA, K, CL, CO2, AGAP, GLU, BUN, CREA, BUCR, GFRAA, GFRNA, CA, TBIL, TBILI, AP, TP, ALB, GLOB, AGRAT, ALT No results found for: IRON, FE, TIBC, IBCT, PSAT, FERR No results found for: FOL, RBCF No results found for: Jr Decant, Kalyn Deeds, Ori Palacios, [id number] Cardiac / Pulmonary Evaluation: UGI Results:  
 
KIA Shay Physician Assistant Physician Assistant Procedures     
Signed Date of Service:  10/21/20 1319 []Hide copied text []Kassie for details Daniel Canseco : 1990 Medical Record Number:[id number] 
  
  
  
  
  
PREPROCEDURE DIAGNOSIS: This patient is preoperative for laparoscopic sleeve gastrectomyprocedure with a history of  reflux disease. 
  
POSTPROCEDURE DIAGNOSIS: This patient is preoperative for laparoscopic sleeve gastrectomyprocedure with a history of  reflux disease. 
  
  
PROCEDURES PERFORMED: Upper GI study with barium. 
  
ESTIMATED BLOOD LOSS: None. 
  
SPECIMENS: None. 
  
STATEMENT OF MEDICAL NECESSITY: The patient is a patient with a 
longstanding history of obesity. They are now considering the laparoscopic sleeve gastrectomyprocedure as a means of surgical weight control and due to their history of reflux disease and are being assessed preoperatively for such. 
  
DESCRIPTION OF PROCEDURE: The patient was brought to the fluoroscopy unit and 
was given thin barium. On swallowing of barium, they were noted to have 
normal peristalsis of their esophagus. They had prompt filling of distal 
esophagus with tapering into the gastroesophageal junction. There was no evidence of a hiatal hernia present. Contrast then filled the gastric cardia, fundus,body and pre pyloric region with no abnormalities noted. Contrast then exited the pylorus in normal fashion. No obstruction was noted. There was no evidence of reflux noted. 
  
(normal anatomy - pt still unsure of surgery choice) 
   
  
 
 
 
Assessment: Morbid obesity with associated comorbidity Plan:  
 
laparoscopic gastric bypass surgery This is a 28 y.o. female with a BMI of Body mass index is 46.36 kg/m². and the weight-related co-morbidties as noted above. Erica Becky meets the NIH criteria for bariatric surgery based upon the BMI of Body mass index is 46.36 kg/m². and  
multiple weight-related co-morbidties.  Erica Pena has elected laparoscopic gastric bypass as her intervention of choice for treatment of morbid obestiy through surgical means secondary to its  
uniform results,  profound baseline suppression of hunger and pace at which weight is lost. 
 
In the office today, following Kavitha's history and physical examination, a 40 minute discussion regarding the anatomic alterations for the laparoscopic gastric bypass  was undertaken. The dietary  
expectations and the patient  dependent factors for success were thoroughly discussed, to include the need for interval follow-up and long-term dietary changes associated with success. The possible short  
and long term  complications of the gastric bypass were also discussed, to include but not limited to;death, DVT/PE, staple line leak, bleeding, stricture formation, infection,internal hernia  and pouch dilation. Specific weight related outcomes for success were also discussed with an emphasis on careful and close follow-up with the first year and Dietary behavior modification over the first years as baseline cyclical  
hunger returns  The patient expressed an understanding of the above factors, and her questions were answered in their entirety. In addition, the patient attended a 1.5 hour power point seminar or online seminar regarding obesity, surgical weight loss including, adjustable gastric band, gastric bypass, and sleeve gastrectomy. This discussion contrasted  
the different surgical techniques, mechanisms of actions and expected outcomes, and surgical and medical risks associated with each procedure. During the in office seminar, there was a long question and answer 
 session where each questions was answered until there were no additional questions.   
 
Today, the patient had all of her questions answered and the decision was made today that the patient's preoperative evaluation is acceptable for them  to proceed with bariatric surgery  choosing  gastric bypass as her surgical option. Secondary Diagnoses:  
 
DVT / Pulmonary Embolus Risk - The patient is at a higher risk for post operative DVT / pulmonary embolus secondary to their morbid obese status, relative sedentary 
 lifestyle, and impending general anesthetic. We will plan to use anticoagulation therapy pre and post operative as well as TEDs and  pneumatic compression devices and  
encourage ambulation once on the hospital nursing floor. The need for  at home anticoagulation therapy has also been discussed. The patient understands  
that their efforts at ambulation are of vital importance to reduce the risk of this complication thus placing significant burden on them as to the prevention of such issues. Signs and symptoms of DVT / PE have been discussed with the patient and they have been instructed to call the office if any these occur in the \"at home\" post op phase. The patient has been provided with a post operative prescription of Lovenox and instructed in its use for DVT prophylaxis. Signed By: Angela Dowling MD   
 April 5, 2021

## 2021-04-06 LAB
A-G RATIO,AGRAT: 1.2 RATIO (ref 1.1–2.6)
ALBUMIN SERPL-MCNC: 4 G/DL (ref 3.5–5)
ALP SERPL-CCNC: 91 U/L (ref 25–115)
ALT SERPL-CCNC: 19 U/L (ref 5–40)
ANION GAP SERPL CALC-SCNC: 12.2 MMOL/L (ref 3–15)
ANISOCYTOSIS: ABNORMAL
AST SERPL W P-5'-P-CCNC: 13 U/L (ref 10–37)
ATRIAL RATE: 92 BPM
BASOPHILS ABS-DIF,2030: 0.1 K/UL (ref 0–0.2)
BASOPHILS C MAN (DIFF), 1068: 1 % (ref 0–2)
BILIRUB SERPL-MCNC: 0.3 MG/DL (ref 0.2–1.2)
BUN SERPL-MCNC: 9 MG/DL (ref 6–22)
CALCIUM SERPL-MCNC: 9.2 MG/DL (ref 8.4–10.5)
CALCULATED P AXIS, ECG09: 70 DEGREES
CALCULATED R AXIS, ECG10: 73 DEGREES
CALCULATED T AXIS, ECG11: 36 DEGREES
CHLORIDE SERPL-SCNC: 100 MMOL/L (ref 98–110)
CO2 SERPL-SCNC: 27 MMOL/L (ref 20–32)
CREAT SERPL-MCNC: 0.6 MG/DL (ref 0.5–1.2)
DIAGNOSIS, 93000: NORMAL
EOS ABS-DIF,2069: 0.2 K/UL (ref 0–0.5)
EOSINOPHILS C MAN (DIFF), 1067: 2 % (ref 0–6)
ERYTHROCYTE [DISTWIDTH] IN BLOOD BY AUTOMATED COUNT: 17.4 % (ref 10–15.5)
GFRAA, 66117: >60
GFRNA, 66118: >60
GLOBULIN,GLOB: 3.3 G/DL (ref 2–4)
GLUCOSE SERPL-MCNC: 73 MG/DL (ref 70–99)
HCG, BETA, HCGTLT: <1 MIU/ML
HCT VFR BLD AUTO: 41.7 % (ref 35.1–46.5)
HGB BLD-MCNC: 12.1 G/DL (ref 11.7–15.5)
LYMPHOCYTES C MAN (DIFF), 1065: 57 % (ref 20–45)
LYMPHS ABS-DIF,2105: 6.4 K/UL (ref 1–4.8)
MCH RBC QN AUTO: 22 PG (ref 26–34)
MCHC RBC AUTO-ENTMCNC: 29 G/DL (ref 31–36)
MCV RBC AUTO: 76 FL (ref 81–99)
MICROCYTES, 12013: ABNORMAL
MONOCYTES ABS-DIF,2141: 0.6 K/UL (ref 0.1–1)
MONOCYTES C MAN (DIFF), 1066: 5 % (ref 3–12)
NEUTROPHILS ABS,2156: 3.9 K/UL (ref 1.8–7.7)
NEUTROPHILS C MAN (DIFF), 1064: 35 % (ref 40–75)
NORMOCHROMIC CELLAVISION, 1078: ABNORMAL
P-R INTERVAL, ECG05: 150 MS
PLATELET # BLD AUTO: 304 K/UL (ref 140–440)
PMV BLD AUTO: 11.9 FL (ref 9–13)
POTASSIUM SERPL-SCNC: 4.1 MMOL/L (ref 3.5–5.5)
PROT SERPL-MCNC: 7.3 G/DL (ref 6.4–8.3)
Q-T INTERVAL, ECG07: 372 MS
QRS DURATION, ECG06: 90 MS
QTC CALCULATION (BEZET), ECG08: 460 MS
RBC # BLD AUTO: 5.51 M/UL (ref 3.8–5.2)
SMEAR EVAL, 1131: ABNORMAL
SODIUM SERPL-SCNC: 139 MMOL/L (ref 133–145)
VENTRICULAR RATE, ECG03: 92 BPM
WBC # BLD AUTO: 11.2 K/UL (ref 4–11)

## 2021-04-07 ENCOUNTER — HOSPITAL ENCOUNTER (OUTPATIENT)
Dept: PREADMISSION TESTING | Age: 31
Discharge: HOME | End: 2021-04-07
Payer: MEDICAID

## 2021-04-07 PROCEDURE — U0003 INFECTIOUS AGENT DETECTION BY NUCLEIC ACID (DNA OR RNA); SEVERE ACUTE RESPIRATORY SYNDROME CORONAVIRUS 2 (SARS-COV-2) (CORONAVIRUS DISEASE [COVID-19]), AMPLIFIED PROBE TECHNIQUE, MAKING USE OF HIGH THROUGHPUT TECHNOLOGIES AS DESCRIBED BY CMS-2020-01-R: HCPCS

## 2021-04-08 ENCOUNTER — HOSPITAL ENCOUNTER (OUTPATIENT)
Dept: LAB | Age: 31
Discharge: HOME | End: 2021-04-08

## 2021-04-08 DIAGNOSIS — D64.9 ANEMIA, UNSPECIFIED TYPE: Primary | ICD-10-CM

## 2021-04-08 LAB
SARS-COV-2, COV2NT: NOT DETECTED
SENTARA SPECIMEN COL,SENBCF: NORMAL

## 2021-04-08 PROCEDURE — 99001 SPECIMEN HANDLING PT-LAB: CPT

## 2021-04-09 LAB
ANISOCYTOSIS: ABNORMAL
BASOPHILS ABS-DIF,2030: 0.1 K/UL (ref 0–0.2)
BASOPHILS C MAN (DIFF), 1068: 1 % (ref 0–2)
ERYTHROCYTE [DISTWIDTH] IN BLOOD BY AUTOMATED COUNT: 16.8 % (ref 10–15.5)
HCT VFR BLD AUTO: 42.6 % (ref 35.1–46.5)
HGB BLD-MCNC: 12.7 G/DL (ref 11.7–15.5)
LYMPHOCYTES C MAN (DIFF), 1065: 61 % (ref 20–45)
LYMPHS ABS-DIF,2105: 7 K/UL (ref 1–4.8)
Lab: ABNORMAL PER 100 WBCS
MCH RBC QN AUTO: 22 PG (ref 26–34)
MCHC RBC AUTO-ENTMCNC: 30 G/DL (ref 31–36)
MCV RBC AUTO: 75 FL (ref 81–99)
MICROCYTES, 12013: ABNORMAL
MONOCYTES ABS-DIF,2141: 0.6 K/UL (ref 0.1–1)
MONOCYTES C MAN (DIFF), 1066: 5 % (ref 3–12)
NEUTROPHILS ABS,2156: 3.7 K/UL (ref 1.8–7.7)
NEUTROPHILS C MAN (DIFF), 1064: 33 % (ref 40–75)
NORMOCHROMIC CELLAVISION, 1078: ABNORMAL
PATH REVIEW OF SMEAR, 12050: NORMAL
PLATELET # BLD AUTO: 330 K/UL (ref 140–440)
PMV BLD AUTO: 11.5 FL (ref 9–13)
RBC # BLD AUTO: 5.68 M/UL (ref 3.8–5.2)
SMEAR EVAL, 1131: ABNORMAL
WBC # BLD AUTO: 11.4 K/UL (ref 4–11)

## 2021-04-12 ENCOUNTER — TELEPHONE (OUTPATIENT)
Dept: SURGERY | Age: 31
End: 2021-04-12

## 2021-04-12 ENCOUNTER — ANESTHESIA EVENT (OUTPATIENT)
Dept: SURGERY | Age: 31
DRG: 403 | End: 2021-04-12
Payer: MEDICAID

## 2021-04-12 NOTE — TELEPHONE ENCOUNTER
RN spoke with patient pre-operatively to review pre-op education. Patient received emailed education to include the following: bariatric book, liquid diet slides, medical presentation, and nutritional presentation. Patient was able to view. Questions were answered in their entirety. Patient was reminded of a clear liquid diet the day before surgery and choices were reviewed, as well as nothing to eat and/or drink after midnight the day before, with the exception of a little bit of water with any medications that may need to be taken the morning of surgery. Patient verbalized understanding. Patient was given a bariatric book during pre-op appointment; electronic book sent, as well. Lovenox prescription: sent electronically  Percocet prescription: Dr. Shelby Barnett will send electronically day of discharge  Prilosec: Patient informed to purchase a two month supply. Children's complete chewable multi-vitamin: Patient informed to purchase. Probiotic: Patient informed to purchase at least a 1 billion strain. Patient was encouraged to contact the office with further questions.

## 2021-04-13 ENCOUNTER — HOSPITAL ENCOUNTER (INPATIENT)
Age: 31
LOS: 1 days | Discharge: HOME | DRG: 403 | End: 2021-04-14
Attending: SPECIALIST | Admitting: SPECIALIST
Payer: MEDICAID

## 2021-04-13 ENCOUNTER — ANESTHESIA (OUTPATIENT)
Dept: SURGERY | Age: 31
DRG: 403 | End: 2021-04-13
Payer: MEDICAID

## 2021-04-13 ENCOUNTER — APPOINTMENT (OUTPATIENT)
Dept: SURGERY | Age: 31
DRG: 403 | End: 2021-04-13
Payer: MEDICAID

## 2021-04-13 DIAGNOSIS — E66.01 MORBID OBESITY (HCC): ICD-10-CM

## 2021-04-13 DIAGNOSIS — K44.9 HIATAL HERNIA: ICD-10-CM

## 2021-04-13 DIAGNOSIS — G89.18 POST-OP PAIN: Primary | ICD-10-CM

## 2021-04-13 LAB
BASOPHILS # BLD: 0 K/UL (ref 0–0.1)
BASOPHILS NFR BLD: 0 % (ref 0–2)
DIFFERENTIAL METHOD BLD: ABNORMAL
EOSINOPHIL # BLD: 0 K/UL (ref 0–0.4)
EOSINOPHIL NFR BLD: 0 % (ref 0–5)
ERYTHROCYTE [DISTWIDTH] IN BLOOD BY AUTOMATED COUNT: 15.9 % (ref 11.6–14.5)
HCG UR QL: NEGATIVE
HCT VFR BLD AUTO: 37.7 % (ref 35–45)
HGB BLD-MCNC: 11.1 G/DL (ref 12–16)
LYMPHOCYTES # BLD: 1.8 K/UL (ref 0.9–3.6)
LYMPHOCYTES NFR BLD: 14 % (ref 21–52)
MCH RBC QN AUTO: 21.9 PG (ref 24–34)
MCHC RBC AUTO-ENTMCNC: 29.4 G/DL (ref 31–37)
MCV RBC AUTO: 74.5 FL (ref 74–97)
MONOCYTES # BLD: 0.6 K/UL (ref 0.05–1.2)
MONOCYTES NFR BLD: 5 % (ref 3–10)
NEUTS SEG # BLD: 10.5 K/UL (ref 1.8–8)
NEUTS SEG NFR BLD: 81 % (ref 40–73)
PLATELET # BLD AUTO: 288 K/UL (ref 135–420)
PMV BLD AUTO: 11.5 FL (ref 9.2–11.8)
RBC # BLD AUTO: 5.06 M/UL (ref 4.2–5.3)
WBC # BLD AUTO: 13 K/UL (ref 4.6–13.2)

## 2021-04-13 PROCEDURE — 77030040506 HC DRN WND MDII -A: Performed by: SPECIALIST

## 2021-04-13 PROCEDURE — 74011250636 HC RX REV CODE- 250/636: Performed by: SPECIALIST

## 2021-04-13 PROCEDURE — 74011000250 HC RX REV CODE- 250: Performed by: SPECIALIST

## 2021-04-13 PROCEDURE — 0DB68ZX EXCISION OF STOMACH, VIA NATURAL OR ARTIFICIAL OPENING ENDOSCOPIC, DIAGNOSTIC: ICD-10-PCS | Performed by: SPECIALIST

## 2021-04-13 PROCEDURE — 77030014008 HC SPNG HEMSTAT J&J -C: Performed by: SPECIALIST

## 2021-04-13 PROCEDURE — 77030034154 HC SHR COAG HARM ACE J&J -F: Performed by: SPECIALIST

## 2021-04-13 PROCEDURE — 81025 URINE PREGNANCY TEST: CPT

## 2021-04-13 PROCEDURE — 77030041458 HC SEALNT FIBRN VISTASEAL J&J -G: Performed by: SPECIALIST

## 2021-04-13 PROCEDURE — 77030036732 HC RELD STPLR VASC J&J -F: Performed by: SPECIALIST

## 2021-04-13 PROCEDURE — 0BQT4ZZ REPAIR DIAPHRAGM, PERCUTANEOUS ENDOSCOPIC APPROACH: ICD-10-PCS | Performed by: SPECIALIST

## 2021-04-13 PROCEDURE — 77030008603 HC TRCR ENDOSC EPATH J&J -C: Performed by: SPECIALIST

## 2021-04-13 PROCEDURE — 74011250636 HC RX REV CODE- 250/636: Performed by: ANESTHESIOLOGY

## 2021-04-13 PROCEDURE — 76210000016 HC OR PH I REC 1 TO 1.5 HR: Performed by: SPECIALIST

## 2021-04-13 PROCEDURE — 77030008518 HC TBNG INSUF ENDO STRY -B: Performed by: SPECIALIST

## 2021-04-13 PROCEDURE — 77030009851 HC PCH RTVR ENDOSC AMR -B: Performed by: SPECIALIST

## 2021-04-13 PROCEDURE — 88313 SPECIAL STAINS GROUP 2: CPT

## 2021-04-13 PROCEDURE — 88305 TISSUE EXAM BY PATHOLOGIST: CPT

## 2021-04-13 PROCEDURE — 77030008683 HC TU ET CUF COVD -A: Performed by: ANESTHESIOLOGY

## 2021-04-13 PROCEDURE — 88307 TISSUE EXAM BY PATHOLOGIST: CPT

## 2021-04-13 PROCEDURE — 65270000029 HC RM PRIVATE

## 2021-04-13 PROCEDURE — 2709999900 HC NON-CHARGEABLE SUPPLY: Performed by: SPECIALIST

## 2021-04-13 PROCEDURE — 77030010030: Performed by: SPECIALIST

## 2021-04-13 PROCEDURE — 43644 LAP GASTRIC BYPASS/ROUX-EN-Y: CPT | Performed by: SPECIALIST

## 2021-04-13 PROCEDURE — 77030033200 HC PRT CLSR CRTR THOMP COOP -C: Performed by: SPECIALIST

## 2021-04-13 PROCEDURE — 76060000035 HC ANESTHESIA 2 TO 2.5 HR: Performed by: SPECIALIST

## 2021-04-13 PROCEDURE — 77030002966 HC SUT PDS J&J -A: Performed by: SPECIALIST

## 2021-04-13 PROCEDURE — 77030010515 HC APPL ENDOCLP LIG J&J -B: Performed by: SPECIALIST

## 2021-04-13 PROCEDURE — 77030005513 HC CATH URETH FOL11 MDII -B: Performed by: SPECIALIST

## 2021-04-13 PROCEDURE — 77010033678 HC OXYGEN DAILY

## 2021-04-13 PROCEDURE — 77030008574 HC TBNG SUC IRR STRY -B: Performed by: SPECIALIST

## 2021-04-13 PROCEDURE — 77030002916 HC SUT ETHLN J&J -A: Performed by: SPECIALIST

## 2021-04-13 PROCEDURE — 77030009968 HC RELD STPLR ENDOSC J&J -D: Performed by: SPECIALIST

## 2021-04-13 PROCEDURE — 77030012893: Performed by: SPECIALIST

## 2021-04-13 PROCEDURE — 77030020269 HC MISC IMPL: Performed by: SPECIALIST

## 2021-04-13 PROCEDURE — 77030003580 HC NDL INSUF VERES J&J -B: Performed by: SPECIALIST

## 2021-04-13 PROCEDURE — 0D164ZA BYPASS STOMACH TO JEJUNUM, PERCUTANEOUS ENDOSCOPIC APPROACH: ICD-10-PCS | Performed by: SPECIALIST

## 2021-04-13 PROCEDURE — 77030002896 HC SUT CLP ABSRB J&J -B: Performed by: SPECIALIST

## 2021-04-13 PROCEDURE — 85025 COMPLETE CBC W/AUTO DIFF WBC: CPT

## 2021-04-13 PROCEDURE — 77030009967 HC RELD STPLR ENDOSC J&J -C: Performed by: SPECIALIST

## 2021-04-13 PROCEDURE — 77030027876 HC STPLR ENDOSC FLX PWR J&J -G1: Performed by: SPECIALIST

## 2021-04-13 PROCEDURE — 74011000250 HC RX REV CODE- 250: Performed by: ANESTHESIOLOGY

## 2021-04-13 PROCEDURE — 74011250637 HC RX REV CODE- 250/637: Performed by: SPECIALIST

## 2021-04-13 PROCEDURE — 77030002933 HC SUT MCRYL J&J -A: Performed by: SPECIALIST

## 2021-04-13 PROCEDURE — 77030016151 HC PROTCTR LNS DFOG COVD -B: Performed by: SPECIALIST

## 2021-04-13 PROCEDURE — 77030040361 HC SLV COMPR DVT MDII -B: Performed by: SPECIALIST

## 2021-04-13 PROCEDURE — 77030002986 HC SUT PROL J&J -A: Performed by: SPECIALIST

## 2021-04-13 PROCEDURE — 77030009426 HC FCPS BIOP ENDOSC BSC -B: Performed by: SPECIALIST

## 2021-04-13 PROCEDURE — 77030041460 HC APL VISTASEAL J&J -B: Performed by: SPECIALIST

## 2021-04-13 PROCEDURE — 77030013079 HC BLNKT BAIR HGGR 3M -A: Performed by: ANESTHESIOLOGY

## 2021-04-13 PROCEDURE — 36415 COLL VENOUS BLD VENIPUNCTURE: CPT

## 2021-04-13 PROCEDURE — 76010000131 HC OR TIME 2 TO 2.5 HR: Performed by: SPECIALIST

## 2021-04-13 PROCEDURE — 77030006643: Performed by: ANESTHESIOLOGY

## 2021-04-13 PROCEDURE — 77030025303 HC STPLR ENDOSC J&J -G: Performed by: SPECIALIST

## 2021-04-13 PROCEDURE — 47379 UNLISTED LAPS PX LIVER: CPT | Performed by: SPECIALIST

## 2021-04-13 PROCEDURE — 77030020407 HC IV BLD WRMR ST 3M -A: Performed by: ANESTHESIOLOGY

## 2021-04-13 PROCEDURE — 77030020782 HC GWN BAIR PAWS FLX 3M -B: Performed by: SPECIALIST

## 2021-04-13 PROCEDURE — 77030008602 HC TRCR ENDOSC EPATH J&J -B: Performed by: SPECIALIST

## 2021-04-13 PROCEDURE — 0FB04ZX EXCISION OF LIVER, PERCUTANEOUS ENDOSCOPIC APPROACH, DIAGNOSTIC: ICD-10-PCS | Performed by: SPECIALIST

## 2021-04-13 PROCEDURE — 77030039961 HC KT CUST COLON BSC -D: Performed by: SPECIALIST

## 2021-04-13 PROCEDURE — 77030002912 HC SUT ETHBND J&J -A: Performed by: SPECIALIST

## 2021-04-13 DEVICE — ECHELON 60MM REINFORCEMENT
Type: IMPLANTABLE DEVICE | Site: STOMACH | Status: FUNCTIONAL
Brand: ECHLEON

## 2021-04-13 RX ORDER — HYDROMORPHONE HYDROCHLORIDE 1 MG/ML
INJECTION, SOLUTION INTRAMUSCULAR; INTRAVENOUS; SUBCUTANEOUS AS NEEDED
Status: DISCONTINUED | OUTPATIENT
Start: 2021-04-13 | End: 2021-04-13 | Stop reason: HOSPADM

## 2021-04-13 RX ORDER — HYDROMORPHONE HYDROCHLORIDE 1 MG/ML
0.2 INJECTION, SOLUTION INTRAMUSCULAR; INTRAVENOUS; SUBCUTANEOUS AS NEEDED
Status: DISCONTINUED | OUTPATIENT
Start: 2021-04-13 | End: 2021-04-13 | Stop reason: HOSPADM

## 2021-04-13 RX ORDER — PROPOFOL 10 MG/ML
INJECTION, EMULSION INTRAVENOUS AS NEEDED
Status: DISCONTINUED | OUTPATIENT
Start: 2021-04-13 | End: 2021-04-13 | Stop reason: HOSPADM

## 2021-04-13 RX ORDER — ACETAMINOPHEN 500 MG
1000 TABLET ORAL
Status: COMPLETED | OUTPATIENT
Start: 2021-04-13 | End: 2021-04-13

## 2021-04-13 RX ORDER — SODIUM CHLORIDE, SODIUM LACTATE, POTASSIUM CHLORIDE, CALCIUM CHLORIDE 600; 310; 30; 20 MG/100ML; MG/100ML; MG/100ML; MG/100ML
150 INJECTION, SOLUTION INTRAVENOUS CONTINUOUS
Status: DISCONTINUED | OUTPATIENT
Start: 2021-04-13 | End: 2021-04-14 | Stop reason: HOSPADM

## 2021-04-13 RX ORDER — NYSTATIN 100000 [USP'U]/ML
500000 SUSPENSION ORAL
Status: COMPLETED | OUTPATIENT
Start: 2021-04-13 | End: 2021-04-13

## 2021-04-13 RX ORDER — MIDAZOLAM HYDROCHLORIDE 1 MG/ML
INJECTION, SOLUTION INTRAMUSCULAR; INTRAVENOUS AS NEEDED
Status: DISCONTINUED | OUTPATIENT
Start: 2021-04-13 | End: 2021-04-13 | Stop reason: HOSPADM

## 2021-04-13 RX ORDER — ROCURONIUM BROMIDE 10 MG/ML
INJECTION, SOLUTION INTRAVENOUS AS NEEDED
Status: DISCONTINUED | OUTPATIENT
Start: 2021-04-13 | End: 2021-04-13 | Stop reason: HOSPADM

## 2021-04-13 RX ORDER — ENOXAPARIN SODIUM 100 MG/ML
40 INJECTION SUBCUTANEOUS EVERY 12 HOURS
Status: DISCONTINUED | OUTPATIENT
Start: 2021-04-13 | End: 2021-04-14 | Stop reason: HOSPADM

## 2021-04-13 RX ORDER — ONDANSETRON 2 MG/ML
INJECTION INTRAMUSCULAR; INTRAVENOUS AS NEEDED
Status: DISCONTINUED | OUTPATIENT
Start: 2021-04-13 | End: 2021-04-13 | Stop reason: HOSPADM

## 2021-04-13 RX ORDER — NALOXONE HYDROCHLORIDE 0.4 MG/ML
0.4 INJECTION, SOLUTION INTRAMUSCULAR; INTRAVENOUS; SUBCUTANEOUS AS NEEDED
Status: DISCONTINUED | OUTPATIENT
Start: 2021-04-13 | End: 2021-04-14 | Stop reason: HOSPADM

## 2021-04-13 RX ORDER — SUCCINYLCHOLINE CHLORIDE 100 MG/5ML
SYRINGE (ML) INTRAVENOUS AS NEEDED
Status: DISCONTINUED | OUTPATIENT
Start: 2021-04-13 | End: 2021-04-13 | Stop reason: HOSPADM

## 2021-04-13 RX ORDER — HYDROCODONE BITARTRATE AND ACETAMINOPHEN 5; 325 MG/1; MG/1
1 TABLET ORAL AS NEEDED
Status: DISCONTINUED | OUTPATIENT
Start: 2021-04-13 | End: 2021-04-13 | Stop reason: HOSPADM

## 2021-04-13 RX ORDER — BUPIVACAINE HYDROCHLORIDE AND EPINEPHRINE 2.5; 5 MG/ML; UG/ML
INJECTION, SOLUTION EPIDURAL; INFILTRATION; INTRACAUDAL; PERINEURAL AS NEEDED
Status: DISCONTINUED | OUTPATIENT
Start: 2021-04-13 | End: 2021-04-13 | Stop reason: HOSPADM

## 2021-04-13 RX ORDER — HYDROMORPHONE HYDROCHLORIDE 1 MG/ML
0.5 INJECTION, SOLUTION INTRAMUSCULAR; INTRAVENOUS; SUBCUTANEOUS
Status: DISCONTINUED | OUTPATIENT
Start: 2021-04-13 | End: 2021-04-13 | Stop reason: HOSPADM

## 2021-04-13 RX ORDER — METOCLOPRAMIDE HYDROCHLORIDE 5 MG/ML
INJECTION INTRAMUSCULAR; INTRAVENOUS AS NEEDED
Status: DISCONTINUED | OUTPATIENT
Start: 2021-04-13 | End: 2021-04-13 | Stop reason: HOSPADM

## 2021-04-13 RX ORDER — LIDOCAINE HYDROCHLORIDE 20 MG/ML
INJECTION, SOLUTION EPIDURAL; INFILTRATION; INTRACAUDAL; PERINEURAL AS NEEDED
Status: DISCONTINUED | OUTPATIENT
Start: 2021-04-13 | End: 2021-04-13 | Stop reason: HOSPADM

## 2021-04-13 RX ORDER — KETOROLAC TROMETHAMINE 15 MG/ML
INJECTION, SOLUTION INTRAMUSCULAR; INTRAVENOUS AS NEEDED
Status: DISCONTINUED | OUTPATIENT
Start: 2021-04-13 | End: 2021-04-13 | Stop reason: HOSPADM

## 2021-04-13 RX ORDER — FLUMAZENIL 0.1 MG/ML
0.2 INJECTION INTRAVENOUS
Status: DISCONTINUED | OUTPATIENT
Start: 2021-04-13 | End: 2021-04-13 | Stop reason: HOSPADM

## 2021-04-13 RX ORDER — SODIUM CHLORIDE, SODIUM LACTATE, POTASSIUM CHLORIDE, CALCIUM CHLORIDE 600; 310; 30; 20 MG/100ML; MG/100ML; MG/100ML; MG/100ML
125 INJECTION, SOLUTION INTRAVENOUS CONTINUOUS
Status: DISCONTINUED | OUTPATIENT
Start: 2021-04-13 | End: 2021-04-13

## 2021-04-13 RX ORDER — KETAMINE HYDROCHLORIDE 10 MG/ML
INJECTION, SOLUTION INTRAMUSCULAR; INTRAVENOUS AS NEEDED
Status: DISCONTINUED | OUTPATIENT
Start: 2021-04-13 | End: 2021-04-13 | Stop reason: HOSPADM

## 2021-04-13 RX ORDER — FAMOTIDINE 10 MG/ML
20 INJECTION INTRAVENOUS ONCE
Status: COMPLETED | OUTPATIENT
Start: 2021-04-13 | End: 2021-04-13

## 2021-04-13 RX ORDER — DIPHENHYDRAMINE HYDROCHLORIDE 50 MG/ML
12.5 INJECTION, SOLUTION INTRAMUSCULAR; INTRAVENOUS
Status: DISCONTINUED | OUTPATIENT
Start: 2021-04-13 | End: 2021-04-13 | Stop reason: HOSPADM

## 2021-04-13 RX ORDER — ONDANSETRON 2 MG/ML
4 INJECTION INTRAMUSCULAR; INTRAVENOUS
Status: DISCONTINUED | OUTPATIENT
Start: 2021-04-13 | End: 2021-04-14 | Stop reason: HOSPADM

## 2021-04-13 RX ORDER — KETOROLAC TROMETHAMINE 30 MG/ML
30 INJECTION, SOLUTION INTRAMUSCULAR; INTRAVENOUS EVERY 6 HOURS
Status: DISCONTINUED | OUTPATIENT
Start: 2021-04-13 | End: 2021-04-14 | Stop reason: HOSPADM

## 2021-04-13 RX ORDER — ONDANSETRON 2 MG/ML
4 INJECTION INTRAMUSCULAR; INTRAVENOUS ONCE
Status: DISCONTINUED | OUTPATIENT
Start: 2021-04-13 | End: 2021-04-13 | Stop reason: HOSPADM

## 2021-04-13 RX ORDER — SODIUM CHLORIDE, SODIUM LACTATE, POTASSIUM CHLORIDE, CALCIUM CHLORIDE 600; 310; 30; 20 MG/100ML; MG/100ML; MG/100ML; MG/100ML
25 INJECTION, SOLUTION INTRAVENOUS CONTINUOUS
Status: DISCONTINUED | OUTPATIENT
Start: 2021-04-13 | End: 2021-04-13 | Stop reason: HOSPADM

## 2021-04-13 RX ORDER — ESMOLOL HYDROCHLORIDE 10 MG/ML
INJECTION INTRAVENOUS
Status: DISCONTINUED | OUTPATIENT
Start: 2021-04-13 | End: 2021-04-13

## 2021-04-13 RX ORDER — ACETAMINOPHEN 500 MG
1000 TABLET ORAL
COMMUNITY
End: 2021-04-14

## 2021-04-13 RX ORDER — ENOXAPARIN SODIUM 100 MG/ML
40 INJECTION SUBCUTANEOUS ONCE
Status: COMPLETED | OUTPATIENT
Start: 2021-04-13 | End: 2021-04-13

## 2021-04-13 RX ORDER — ALBUTEROL SULFATE 0.83 MG/ML
2.5 SOLUTION RESPIRATORY (INHALATION)
Status: DISCONTINUED | OUTPATIENT
Start: 2021-04-13 | End: 2021-04-13 | Stop reason: HOSPADM

## 2021-04-13 RX ORDER — MORPHINE SULFATE 10 MG/ML
6 INJECTION, SOLUTION INTRAMUSCULAR; INTRAVENOUS
Status: DISCONTINUED | OUTPATIENT
Start: 2021-04-13 | End: 2021-04-14

## 2021-04-13 RX ORDER — ESMOLOL HYDROCHLORIDE 10 MG/ML
INJECTION INTRAVENOUS AS NEEDED
Status: DISCONTINUED | OUTPATIENT
Start: 2021-04-13 | End: 2021-04-13 | Stop reason: HOSPADM

## 2021-04-13 RX ADMIN — CEFAZOLIN 3 G: 1 INJECTION, POWDER, FOR SOLUTION INTRAVENOUS at 07:33

## 2021-04-13 RX ADMIN — ROCURONIUM BROMIDE 10 MG: 10 INJECTION, SOLUTION INTRAVENOUS at 07:23

## 2021-04-13 RX ADMIN — LIDOCAINE HYDROCHLORIDE 80 MG: 20 INJECTION, SOLUTION EPIDURAL; INFILTRATION; INTRACAUDAL; PERINEURAL at 07:23

## 2021-04-13 RX ADMIN — PROMETHAZINE HYDROCHLORIDE 12.5 MG: 25 INJECTION INTRAMUSCULAR; INTRAVENOUS at 15:00

## 2021-04-13 RX ADMIN — ESMOLOL HYDROCHLORIDE 10 MG: 10 INJECTION, SOLUTION INTRAVENOUS at 07:44

## 2021-04-13 RX ADMIN — KETOROLAC TROMETHAMINE 30 MG: 15 INJECTION, SOLUTION INTRAMUSCULAR; INTRAVENOUS at 09:07

## 2021-04-13 RX ADMIN — HYDROMORPHONE HYDROCHLORIDE 0.5 MG: 1 INJECTION, SOLUTION INTRAMUSCULAR; INTRAVENOUS; SUBCUTANEOUS at 08:24

## 2021-04-13 RX ADMIN — SODIUM CHLORIDE, SODIUM LACTATE, POTASSIUM CHLORIDE, AND CALCIUM CHLORIDE 150 ML/HR: 600; 310; 30; 20 INJECTION, SOLUTION INTRAVENOUS at 23:01

## 2021-04-13 RX ADMIN — ROCURONIUM BROMIDE 30 MG: 10 INJECTION, SOLUTION INTRAVENOUS at 07:30

## 2021-04-13 RX ADMIN — SODIUM CHLORIDE, SODIUM LACTATE, POTASSIUM CHLORIDE, AND CALCIUM CHLORIDE: 600; 310; 30; 20 INJECTION, SOLUTION INTRAVENOUS at 07:20

## 2021-04-13 RX ADMIN — ONDANSETRON 4 MG: 2 INJECTION INTRAMUSCULAR; INTRAVENOUS at 18:49

## 2021-04-13 RX ADMIN — METOCLOPRAMIDE 10 MG: 5 INJECTION, SOLUTION INTRAMUSCULAR; INTRAVENOUS at 07:34

## 2021-04-13 RX ADMIN — KETOROLAC TROMETHAMINE 30 MG: 30 INJECTION, SOLUTION INTRAMUSCULAR at 11:33

## 2021-04-13 RX ADMIN — MORPHINE SULFATE 6 MG: 10 INJECTION INTRAVENOUS at 15:01

## 2021-04-13 RX ADMIN — ESMOLOL HYDROCHLORIDE 10 MG: 10 INJECTION, SOLUTION INTRAVENOUS at 08:57

## 2021-04-13 RX ADMIN — HYDROMORPHONE HYDROCHLORIDE 0.5 MG: 1 INJECTION, SOLUTION INTRAMUSCULAR; INTRAVENOUS; SUBCUTANEOUS at 08:37

## 2021-04-13 RX ADMIN — ENOXAPARIN SODIUM 40 MG: 40 INJECTION SUBCUTANEOUS at 06:39

## 2021-04-13 RX ADMIN — ACETAMINOPHEN 1000 MG: 500 TABLET ORAL at 06:37

## 2021-04-13 RX ADMIN — CEFAZOLIN 3 G: 10 INJECTION, POWDER, FOR SOLUTION INTRAVENOUS at 16:50

## 2021-04-13 RX ADMIN — PROPOFOL 200 MG: 10 INJECTION, EMULSION INTRAVENOUS at 07:23

## 2021-04-13 RX ADMIN — PROMETHAZINE HYDROCHLORIDE 12.5 MG: 25 INJECTION INTRAMUSCULAR; INTRAVENOUS at 21:33

## 2021-04-13 RX ADMIN — NYSTATIN 500000 UNITS: 100000 SUSPENSION ORAL at 06:40

## 2021-04-13 RX ADMIN — MORPHINE SULFATE 6 MG: 10 INJECTION INTRAVENOUS at 18:50

## 2021-04-13 RX ADMIN — KETAMINE HYDROCHLORIDE 20 MG: 10 INJECTION, SOLUTION INTRAMUSCULAR; INTRAVENOUS at 09:02

## 2021-04-13 RX ADMIN — ESMOLOL HYDROCHLORIDE 10 MG: 10 INJECTION, SOLUTION INTRAVENOUS at 08:37

## 2021-04-13 RX ADMIN — SUGAMMADEX 250 MG: 100 INJECTION, SOLUTION INTRAVENOUS at 09:06

## 2021-04-13 RX ADMIN — Medication 140 MG: at 07:23

## 2021-04-13 RX ADMIN — ONDANSETRON 4 MG: 2 INJECTION INTRAMUSCULAR; INTRAVENOUS at 13:03

## 2021-04-13 RX ADMIN — MIDAZOLAM 2 MG: 1 INJECTION INTRAMUSCULAR; INTRAVENOUS at 07:20

## 2021-04-13 RX ADMIN — ONDANSETRON HYDROCHLORIDE 4 MG: 2 INJECTION INTRAMUSCULAR; INTRAVENOUS at 08:58

## 2021-04-13 RX ADMIN — SODIUM CHLORIDE, SODIUM LACTATE, POTASSIUM CHLORIDE, AND CALCIUM CHLORIDE: 600; 310; 30; 20 INJECTION, SOLUTION INTRAVENOUS at 07:49

## 2021-04-13 RX ADMIN — SODIUM CHLORIDE, SODIUM LACTATE, POTASSIUM CHLORIDE, AND CALCIUM CHLORIDE 150 ML/HR: 600; 310; 30; 20 INJECTION, SOLUTION INTRAVENOUS at 13:04

## 2021-04-13 RX ADMIN — HYDROMORPHONE HYDROCHLORIDE 1 MG: 1 INJECTION, SOLUTION INTRAMUSCULAR; INTRAVENOUS; SUBCUTANEOUS at 07:29

## 2021-04-13 RX ADMIN — MORPHINE SULFATE 6 MG: 10 INJECTION INTRAVENOUS at 21:28

## 2021-04-13 RX ADMIN — MORPHINE SULFATE 6 MG: 10 INJECTION INTRAVENOUS at 11:32

## 2021-04-13 RX ADMIN — SODIUM CHLORIDE, SODIUM LACTATE, POTASSIUM CHLORIDE, AND CALCIUM CHLORIDE 125 ML/HR: 600; 310; 30; 20 INJECTION, SOLUTION INTRAVENOUS at 09:35

## 2021-04-13 RX ADMIN — KETAMINE HYDROCHLORIDE 30 MG: 10 INJECTION, SOLUTION INTRAMUSCULAR; INTRAVENOUS at 07:23

## 2021-04-13 RX ADMIN — CEFAZOLIN 3 G: 10 INJECTION, POWDER, FOR SOLUTION INTRAVENOUS at 23:42

## 2021-04-13 RX ADMIN — SODIUM CHLORIDE, SODIUM LACTATE, POTASSIUM CHLORIDE, AND CALCIUM CHLORIDE: 600; 310; 30; 20 INJECTION, SOLUTION INTRAVENOUS at 08:35

## 2021-04-13 RX ADMIN — FAMOTIDINE 20 MG: 10 INJECTION, SOLUTION INTRAVENOUS at 06:39

## 2021-04-13 RX ADMIN — SODIUM CHLORIDE, SODIUM LACTATE, POTASSIUM CHLORIDE, AND CALCIUM CHLORIDE 125 ML/HR: 600; 310; 30; 20 INJECTION, SOLUTION INTRAVENOUS at 06:36

## 2021-04-13 RX ADMIN — ESMOLOL HYDROCHLORIDE 10 MG: 10 INJECTION, SOLUTION INTRAVENOUS at 07:32

## 2021-04-13 NOTE — PROGRESS NOTES
1922 Assumed patient care at this time. Report received from Flo Carver RN. Patient rating pain 6/10. Patient in bed in lowest position with wheels locked. Call light within reach. 1930 Shift assessment completed at this time. Patient is alert and oriented x4. Lungs clear on room air. Patient can get IS to 750. Last bowel movement on 04/12. 5 trocar sties to abdomen. GAYATHRI drain intact. Minimal in bulb. Patient tolerating ice chips well. Iraheta patent and draining yellow urine. TEDs and SCDs applied. 18 gauge to the left hand infusing LR at 150 mL/hr. 18 gauge to the right AC, INT. Call light within reach. 2104 Patient ambulating in hallway. 2128 PRN morphine administered for 10/10 severe pain. 2133 PRN phenergan administered for nausea. 0011 PRN morphine administered for 10/10 severe pain. Zofran administered for nausea. 5784 PRN morphine administered for 10/10 severe pain. 0349 PRN phenergan administered for nausea. 3633 Iraheta removed. 0710 Bedside and verbal shift change report given to Mookie Brooks RN (oncoming nurse) by Sergio Burgess RN (offgoing nurse). Report included the following information: SBAR, Kardex, MAR, and recent results. Dayshift RN alerted of patient's elevated pulse, will alert Dr. Mayo Garcia.  Patient rating pain 10/10 and is very uncomfortable.

## 2021-04-13 NOTE — PERIOP NOTES
Urine pregnancy results negative and verified with Dandre. Reviewed PTA medication list with patient/caregiver and patient/caregiver denies any additional medications. Patient admits to having a responsible adult care for them at home for at least 24 hours after surgery. Patient encouraged to use gown warming system and informed that using said warming gown to regulate body temperature prior to a procedure has been shown to help reduce the risks of blood clots and infection. Patient's pharmacy of choice verified and documented in PTA medication section. Dual skin assessment & fall risk band verification completed with [employer].

## 2021-04-13 NOTE — OP NOTES
OPERATIVE REPORT                           [facility]                 : 1990                Medical Record [id number]                  Pre-operative Diagnosis:  MORBID OBESITY,BMI 45,FATTY LIVER                Post-operative Diagnosis: MORBID OBESITY,BMI 45,FATTY LIVER                Procedure: Procedure(s):                LAPAROSCOPIC GASTRIC BYPASS- ANTECOLIC / ANTEGASTRIC                DIAPHRAGMATIC HERNIA REPAIR                WEDGE LIVER BIOPSY                INTRAOPERATIVE ENDOSCOPY WITH BIOPSY                Location: [facility]                Surgeon: Young Camp MD                Assistant:  [facility] (there are no qualified interns, residents, or any other qualified house physicians available to assist with this surgery) - performed retraction of various structures, facilitated creating small bowel anastomsis, placed circular stapling cap through the stomach wall, assisted in creating the gastric pouch, fired various stapling devices, obtained hemostasis along staple lines via hemoclips, applied Eviseal,  retrieved all specimens from the abdominal cavity, closed fascial defect, and sutured incisions                    Anesthesia: General                 Specimen:  Liver Wedge  Biopsy, Endoscopy biopsy                EBL: less than 5cc                Complications: none                      STATEMENT OF MEDICAL NECESSITY: The patient is a 28y.o.-year-old female who has had a long-standing history of obesity. She has developed health problems related to  obesity and has significant cardiac disease and came to me in consultation  for the gastric bypass procedure. she has undergone preoperative evaluation and was felt to be a reasonable candidate for surgery. I explained to the patient the risks associated with this procedure and she understood all this very clearly and did wish to proceed with operative intervention at this time period. OPERATIVE PROCEDURE: The patient was brought to the operating room, placed on the table in the supine position at which time period general anesthesia was administered without any difficulty. The abdomen was then prepped and draped in  The usual sterile fashion. Using a 15 blade, a 1 cm incision was made just to the left of the midline at the level of the umbilicus. A Veres needle approach was used to gain access to the peritoneal cavity which was then insufflated. The Visiport was then placed at that site insufflating the abdomen, then 4 additional trocars were placed in the usual U-shaped configuration with a subxiphoid incision being made to accommodate the [facility] retractor. On entering the abdomen, the patient was noted to have a massively fatty liver with some evidence of nodularity throughout possibly consistent with early steatohepatitis. I began the operation by choosing an area approximately 50 cm distal to the ligament of Treitz. I transected the small bowel using the Endo BELKYS stapler with white reloads, I then divided the mesentery of the small bowel using 3 firings of the Endo BELKYS stapler, which allowed for excellent mobilization to the upper abdominal region. I then measured off a 150-cm Odilon limb bypass and placed  it in a side-to-side fashion with the afferent limb placing stay sutures in the 2 limbs of the bowel. I then created enterotomies in the 2 limbs of the bowel and placed the Endo BELKYS stapler intra luminally closing it and firing  it creating the linear anastomosis. With this anastomosis being hemostatic, the free margin of the enterotomy was then re approximated using 2-0 Ethibond suture and these sutures were then held up to facilitate closure using the stapling device. The mesenteric defect at this site was then closed  using a running 2-0 Ethibond suture. I then elected to bring the odilon limb anterior to the transverse colon and did so in the usual fashion.  The odilon limb reached nicely to the upper abdominal region under no tension. I then placed a Baker's tube transorally in the stomach and inflated the balloon to 20 mL of saline solution and then I pulled it back towards the gastroesophageal junction. I then at this juncture dissected along the angle of His, exposing the left crura and I likewise dissected along the lesser curvature of the stomach, entering the retro gastric space. Once this space was then developed, I then marked the planned anastomosis of the pouch using a single dot of dilute methylene blue. I then removed the Baker's tube at this juncture. An anterior gastrotomy was then fashioned in the antrum of the stomach, then the cap of a 21 ILS stapler was then placed transabdominally guiding it through the gastrotomy out through the anterior gastric pouch in the area marked using a flamingo grasper and a Prolene suture attached to the cap. After retrieving the cap, a purse string suture was then placed at the base and tied securely. I then created the pouch using the powered Stickney stapler with bluereloads and Endopath buttress. I used one firing along the base of the planned pouch then 3 vertical firings completing this linear 20 mL pouch. With the pouch having been created, the anterior gastrotomy was then closed using the same stapling device. I then at this juncture brought the Odilon limb in a antecolic, antegastric fashion. It reached nicely to the level of the pouch under no tension at all. I then opened up the free end of the Odilon limb using the Harmonic scalpel. I guided the circular stapling device transabdominally through the left lower quadrant incision, guiding it through the enterotomy thendeploying the spear through the antimesenteric border of the bowel. It was then attached to the cap, closed and fired creating the circular anastomosis.  With 2 very good tissue rings  noted within the stapling device, the free margin of the Odilon limb was then closed using the Endo BELKYS stapler with white reloads. I then over sewed the anastomosis using 2-0 Ethibond suture. At this juncture during the operation I had identified a hiatal hernia early in the procedure. This was quite difficult to visualize due to the massive nature of her liver but I eventually dissected it out and closed it using 2-0 Ethibond suture against the esophageal wall. I then left the operative field and proceeded to the the head of the bed and performed an intraoperative EGD. The scope was passed successfully into the gastric pouch to the level of the anastomosis. There was no bleeding noted and no leak appreciated with air insufflation. A biopsy for H. Pylori was   performed and submitted to pathology. I then returned to the surgical field. At this juncture I then straightened out the Odilon limb which  passed anterior to the transverse colon. When this was all achieved, I then turned my attention to checking all areas for hemostasis using Eviseal and Sugicel SNOW to achieve this. I then removed the liver retractor and due to its abnormal appearance  I did perform a liver wedge biopsy it to assess for fibrosis and submitted it to pathology for permanent section. I then placed a J-P drain in the upper abdominal region. I removed all trocars and closed the left lower quadrant trocar site using a transabdominal 0 PDS suture along the fascia. All skin incisions were irrigated with polymyxin irrigation and  then closed using 4-0 sutures of Monocryl and Steri-Strips and sterile dressings were applied. The patient tolerated the procedure well.                  Skyler Lundy M.D.

## 2021-04-13 NOTE — NURSE NAVIGATOR
Patient dozing in and out of sleep throughout visit. Patient complained of expected pain with mild nausea. Vitals:   Visit Vitals  /75   Pulse 86   Temp 97.4 °F (36.3 °C)   Resp 20   Ht 5' 5\" (1.651 m)   Wt 125.2 kg (276 lb)   LMP 03/22/2021 (Approximate)   SpO2 97%   BMI 45.93 kg/m²     General: Alert & oriented to person, place, time, and situation  Pulmonary: Lungs clear to auscultation in all fields. Cardiac: Regular rate and rhythm  Abdomen: Appropriate tenderness; soft; non-distended;   hypo-active bowel sounds  Lap sites: Within normal limits  GAYATHRI drain: Moderate amount of sanguinous drainage; RN emptied and charted 50 cc. Iraheta catheter: 50 cc clear, yellow urine  SCD's: In place and operating WNL    Plan:  -Continue medications for symptom management  -Encourage ambulation  -SCD use when in bed  -IS 10 times an hour  -NPO  -UGI in AM; bariatric full liquid diet  if UGI within normal limits  -Iraheta removed in AM    Plan was discussed with patient, as well as IS teaching provided. Patient verbalized understanding to plan and education.

## 2021-04-13 NOTE — PROGRESS NOTES
Bedside and Verbal shift change report given to Elroy Henrik Naidu by Joseph Bower RN. Report included the following information SBAR, Kardex, OR Summary, Intake/Output and MAR.

## 2021-04-13 NOTE — INTERVAL H&P NOTE
Update History & Physical 
 
The Patient's History and Physical of April 5, 2021 was reviewed with the patient and I examined the patient. There was no change. The surgical site was confirmed by the patient and me. Plan:  The risk, benefits, expected outcome, and alternative to the recommended procedure have been discussed with the patient. Patient understands and wants to proceed with the procedure.  
 
Electronically signed by Wesly Boswell MD on 4/13/2021 at 5:57 AM

## 2021-04-13 NOTE — NURSE NAVIGATOR
Dr. Oanh Rey requested patient's Toradol and Lovenox be held due to GAYATHRI drainage being sanguinous. RN informed Blanca Butler, who verbalized understanding.

## 2021-04-13 NOTE — PROGRESS NOTES
Transition of Care (SOLE) Plan:          Pt admitted for an elective surgical procedure. Pt is independent. Please encourage ambulation. No transition of care needs identified at this time. Anticipate pt will be medically stable for discharge within the next 24-48 hours with physician follow up. CM available to assist as needed. Cm will cont to review and remain available for d/c planning. SOLE Transportation:   How is patient being transported at discharge? Family/Friend      When? Once cleared by physician     Is transport scheduled? N/A      Follow-up appointment and transportation:   PCP/Specialist?  See AVS for Appointment         Who is transporting to the follow-up appointment? Self/Family/Friend      Is transport for follow up appointment scheduled? N/A    Communication plan (with patient/family): Who is being called? Patient or Next of Kin? Responsible party? Patient      What number(s) is to be used? See Facesheet      What service provider is calling for [facility] services? When are they calling? Readmission Risk? (Green/Low; Yellow/Moderate; Red/High):  [place] here to complete [address] including selection of the Healthcare Decision Maker Relationship (ie \"Primary\")  Care Management Interventions  PCP Verified by CM: Yes  Palliative Care Criteria Met (RRAT>21 & CHF Dx)?: No  Mode of Transport at Discharge:  Other (see comment)(family)  Current Support Network: (family)  Discharge Location  Discharge Placement: Home

## 2021-04-13 NOTE — PERIOP NOTES
TRANSFER - OUT REPORT:    Verbal report given to [facility] RN (name) on Shy Velazquez  being transferred to 2 S (unit) for routine progression of care       Report consisted of patients Situation, Background, Assessment and   Recommendations(SBAR). Information from the following report(s) OR Summary, Procedure Summary, Intake/Output and MAR was reviewed with the receiving nurse. Lines:   Peripheral IV 04/13/21 Left Hand (Active)   Site Assessment Clean, dry, & intact 04/13/21 1020   Phlebitis Assessment 0 04/13/21 1020   Infiltration Assessment 0 04/13/21 1020   Dressing Status Clean, dry, & intact 04/13/21 1020   Dressing Type Transparent;Tape 04/13/21 1020   Hub Color/Line Status Infusing 04/13/21 1020        Intake/Output Summary (Last 24 hours) at 4/13/2021 1050  Last data filed at 4/13/2021 1050  Gross per 24 hour   Intake 3300 ml   Output 50 ml   Net 3250 ml       Opportunity for questions and clarification was provided.       Patient transported with:   O2 @ 3 liters  Registered Nurse

## 2021-04-13 NOTE — ANESTHESIA POSTPROCEDURE EVALUATION
Post-Anesthesia Evaluation & Assessment    Visit Vitals  /68   Pulse 86   Temp 36.3 °C (97.4 °F)   Resp 28   Ht 5' 5\" (1.651 m)   Wt 125.2 kg (276 lb)   SpO2 98%   BMI 45.93 kg/m²       Nausea/Vomiting: no nausea and no vomiting    Post-operative hydration adequate. Pain score (VAS): 1    Mental status & Level of consciousness: alert and oriented x 3    Neurological status: moves all extremities, sensation grossly intact    Pulmonary status: airway patent, no supplemental oxygen required    Complications related to anesthesia: none    Patient has met all discharge requirements. Additional comments:  Procedure(s):  LAPAROSCOPIC GASTRIC BYPASS, DIAPHRAGMATIC HERNIA REPAIR, WEDGE LIVER BIOPSY AND INTRAOPERATIVE ENDOSCOPY WITH BIOPSY.     general    <BSHSIANPOST>    INITIAL Post-op Vital signs:   Vitals Value Taken Time   /68 04/13/21 1030   Temp 36.3 °C (97.4 °F) 04/13/21 0931   Pulse 86 04/13/21 1044   Resp 28 04/13/21 1044   SpO2 98 % 04/13/21 1044

## 2021-04-13 NOTE — ANESTHESIA PREPROCEDURE EVALUATION
Relevant Problems   RESPIRATORY SYSTEM   (+) Smoker      ENDOCRINE   (+) Morbid obesity (HCC)      HEMATOLOGY   (+) Anemia       Anesthetic History   No history of anesthetic complications            Review of Systems / Medical History  Patient summary reviewed, nursing notes reviewed and pertinent labs reviewed    Pulmonary          Smoker (quit 1/21)    Pertinent negatives: No sleep apnea  Comments: Inhaler use when had a respiratory infection   Neuro/Psych         Psychiatric history     Cardiovascular                Pertinent negatives: No hypertension  Exercise tolerance: >4 METS     GI/Hepatic/Renal             Pertinent negatives: No GERD   Endo/Other        Morbid obesity  Pertinent negatives: No diabetes   Other Findings              Physical Exam    Airway  Mallampati: I  TM Distance: 4 - 6 cm  Neck ROM: normal range of motion        Cardiovascular    Rhythm: regular  Rate: normal         Dental      Comments: Chipped upper incisors   Pulmonary  Breath sounds clear to auscultation              Comments: Lower right wheeze cleared with cough Abdominal  GI exam deferred       Other Findings            Anesthetic Plan    ASA: 3  Anesthesia type: general          Induction: Intravenous  Anesthetic plan and risks discussed with: Patient

## 2021-04-14 ENCOUNTER — APPOINTMENT (OUTPATIENT)
Dept: GENERAL RADIOLOGY | Age: 31
DRG: 403 | End: 2021-04-14
Attending: SPECIALIST
Payer: MEDICAID

## 2021-04-14 VITALS
BODY MASS INDEX: 45.98 KG/M2 | DIASTOLIC BLOOD PRESSURE: 62 MMHG | RESPIRATION RATE: 18 BRPM | TEMPERATURE: 98.6 F | HEART RATE: 98 BPM | SYSTOLIC BLOOD PRESSURE: 120 MMHG | OXYGEN SATURATION: 99 % | HEIGHT: 65 IN | WEIGHT: 276 LBS

## 2021-04-14 PROCEDURE — 74011250636 HC RX REV CODE- 250/636: Performed by: SPECIALIST

## 2021-04-14 PROCEDURE — C9113 INJ PANTOPRAZOLE SODIUM, VIA: HCPCS | Performed by: SPECIALIST

## 2021-04-14 PROCEDURE — 74240 X-RAY XM UPR GI TRC 1CNTRST: CPT

## 2021-04-14 PROCEDURE — 74011000636 HC RX REV CODE- 636: Performed by: SPECIALIST

## 2021-04-14 PROCEDURE — 74011250637 HC RX REV CODE- 250/637: Performed by: SPECIALIST

## 2021-04-14 PROCEDURE — 74011000250 HC RX REV CODE- 250: Performed by: SPECIALIST

## 2021-04-14 RX ORDER — OXYCODONE AND ACETAMINOPHEN 5; 325 MG/1; MG/1
1 TABLET ORAL
Qty: 30 TAB | Refills: 0 | Status: SHIPPED | OUTPATIENT
Start: 2021-04-14 | End: 2021-04-19

## 2021-04-14 RX ORDER — OXYCODONE AND ACETAMINOPHEN 5; 325 MG/1; MG/1
1-2 TABLET ORAL
Status: DISCONTINUED | OUTPATIENT
Start: 2021-04-14 | End: 2021-04-14 | Stop reason: HOSPADM

## 2021-04-14 RX ORDER — ENOXAPARIN SODIUM 100 MG/ML
40 INJECTION SUBCUTANEOUS EVERY 12 HOURS
Qty: 14 SYRINGE | Refills: 0 | Status: SHIPPED
Start: 2021-04-14 | End: 2021-04-21

## 2021-04-14 RX ORDER — ENOXAPARIN SODIUM 100 MG/ML
40 INJECTION SUBCUTANEOUS EVERY 12 HOURS
Qty: 14 SYRINGE | Refills: 0 | Status: SHIPPED | OUTPATIENT
Start: 2021-04-14 | End: 2021-04-29 | Stop reason: ALTCHOICE

## 2021-04-14 RX ORDER — PHENOL/SODIUM PHENOLATE
20 AEROSOL, SPRAY (ML) MUCOUS MEMBRANE DAILY
Qty: 30 TAB | Refills: 1 | Status: SHIPPED | OUTPATIENT
Start: 2021-04-14

## 2021-04-14 RX ADMIN — ONDANSETRON 4 MG: 2 INJECTION INTRAMUSCULAR; INTRAVENOUS at 15:28

## 2021-04-14 RX ADMIN — MORPHINE SULFATE 6 MG: 10 INJECTION INTRAVENOUS at 03:07

## 2021-04-14 RX ADMIN — PROMETHAZINE HYDROCHLORIDE 12.5 MG: 25 INJECTION INTRAMUSCULAR; INTRAVENOUS at 03:49

## 2021-04-14 RX ADMIN — OXYCODONE HYDROCHLORIDE AND ACETAMINOPHEN 1 TABLET: 5; 325 TABLET ORAL at 15:28

## 2021-04-14 RX ADMIN — SODIUM CHLORIDE 40 MG: 9 INJECTION, SOLUTION INTRAMUSCULAR; INTRAVENOUS; SUBCUTANEOUS at 08:33

## 2021-04-14 RX ADMIN — ONDANSETRON 4 MG: 2 INJECTION INTRAMUSCULAR; INTRAVENOUS at 00:11

## 2021-04-14 RX ADMIN — OXYCODONE HYDROCHLORIDE AND ACETAMINOPHEN 1 TABLET: 5; 325 TABLET ORAL at 18:11

## 2021-04-14 RX ADMIN — MORPHINE SULFATE 6 MG: 10 INJECTION INTRAVENOUS at 10:19

## 2021-04-14 RX ADMIN — SODIUM CHLORIDE, SODIUM LACTATE, POTASSIUM CHLORIDE, AND CALCIUM CHLORIDE 150 ML/HR: 600; 310; 30; 20 INJECTION, SOLUTION INTRAVENOUS at 06:40

## 2021-04-14 RX ADMIN — IOHEXOL 35 ML: 240 INJECTION, SOLUTION INTRATHECAL; INTRAVASCULAR; INTRAVENOUS; ORAL at 10:12

## 2021-04-14 RX ADMIN — MORPHINE SULFATE 6 MG: 10 INJECTION INTRAVENOUS at 00:11

## 2021-04-14 RX ADMIN — ONDANSETRON 4 MG: 2 INJECTION INTRAMUSCULAR; INTRAVENOUS at 10:19

## 2021-04-14 NOTE — PROGRESS NOTES
Bariatric Surgery                POD #1    Visit Vitals  /72 (BP 1 Location: Left upper arm, BP Patient Position: At rest)   Pulse (!) 106   Temp 98.6 °F (37 °C)   Resp 20   Ht 5' 5\" (1.651 m)   Wt 125.2 kg (276 lb)   LMP 03/22/2021 (Approximate)   SpO2 99%   BMI 45.93 kg/m²     Patient has minimal complaints of pain, minimal nausea noted     Exam:  Appears well in no distress  Lungs- clear bilaterally  Abd - soft, incisions look good without erythema           GAYATHRI with minimal serosanguinous output  Extremities- no new edema or swelling    UGI - pending    Data Review:    Labs: Results:       Chemistry No results for input(s): GLU, NA, K, CL, CO2, BUN, CREA, CA, AGAP, BUCR, TBIL, AP, TP, ALB, GLOB, AGRAT in the last 72 hours. No lab exists for component: GPT   CBC w/Diff Recent Labs     04/13/21  1455   WBC 13.0   RBC 5.06   HGB 11.1*   HCT 37.7      GRANS 81*   LYMPH 14*   EOS 0      Coagulation No results for input(s): PTP, INR, APTT, INREXT in the last 72 hours. Liver Enzymes No results for input(s): TP, ALB, TBIL, AP in the last 72 hours. No lab exists for component: SGOT, GPT, DBIL       Assessment/Plan: S/P  laparoscopic gastric bypass surgery - doing well without any issues    Following orders after UGI normal -     1. Start bariatric diet and protein shakes  2. D/C IV pain meds and start PO pain meds  3. D/C GAYATHRI drain  4. Likley PM D/C if  Cont ok and tolerate PO

## 2021-04-14 NOTE — NURSE NAVIGATOR
Patient awaiting bariatric tray. Vitals:   Blood pressure (!) 119/44, pulse (!) 106, temperature 98.4 °F (36.9 °C), resp. rate 18, height 5' 5\" (1.651 m), weight 125.2 kg (276 lb), last menstrual period 03/22/2021, SpO2 98 %. RN retook heart rate, manually, this visit, and it was 80 bpm, radially. Output: reviewed, and patient verified urinating clear, yellow urine. Pulmonary: Clear in all lobes  Cardiac: Regular rate and rhythm  Abdomen: Bowel sounds hypo-active x4. Lap sites without erythema, swelling,  and/or drainage. GAYATHRI drain with a small amount of serosanguinous drainage. SCD's: Positioned and operating WNL    Patient with expected pain, but is being managed and is currently tolerable. No nausea and/or vomiting. Patient has been ambulating the halls. Patient has not had the opportunity to swallow pills. Post-op diet progression discussed with patient. Patient to be discharged on a bariatric full liquid diet. Patient verbalized understanding of liquid diet for next two weeks until first post-op visit. Goal of four ounces per hour with one ounce being protein was clearly understood. Medications were discussed (i.e., pain- Percocet, Tylenol, not to use aspirin or ibuprofen based products, as well as steroids). Constipation was discussed and ways to alleviate it were discussed. Education completed on IS use and to ambulate every hour when at home. Patient completed a return demonstration on IS with no issues or concerns from this RN. Lovenox filled and in the home. Lovenox education provided, and patient will be administering herself. Percocet script will be given to patient upon discharge. Patient has chewable multi-vitamin, probiotic, and Prilosec in the home; they were reviewed. Ketosis was also reviewed. Patient given a report card to record intake and a handout to support bedside education.   All questions were answered by this RN, and patient verbalized understanding to all education provided. Goals for discharge were discussed, and patient verbalized understanding. Post-op follow-up appt. ricki scheduled.

## 2021-04-14 NOTE — PROGRESS NOTES
8090 - Discharge instructions reviewed with patient at this time. Opportunity for questions and clarification was provided. Patient has verbalized understanding. Patient was provided with care notes to include side effects of RX's. Arm bands removed and shredded. AVS reviewed with Meredith Saenz RN. IV removed. Pain 10/10, 2nd tab of PRN 1-2 order administered at this time.

## 2021-04-14 NOTE — DISCHARGE SUMMARY
Discharge Summary    Patient: Shekhar Wilson               Sex: female          DOA: 4/13/2021         YOB: 1990      Age:  28 y.o.        LOS:  LOS: 1 day                Admit Date: 4/13/2021    Discharge Date: 4/14/2021    Admission Diagnoses: Morbid obesity with BMI of 45.0-49.9, adult ([facility] [E66.01, Z68.42]    Discharge Diagnoses:    Problem List as of 4/14/2021 Date Reviewed: 1/28/2021          Codes Class Noted - Resolved    Former smoker ICD-10-CM: Z87.891  ICD-9-CM: V15.82  Unknown - Present        Morbid obesity ([facility] ICD-10-CM: E66.01  ICD-9-CM: 278.01  8/25/2020 - Present        Morbid obesity with BMI of 45.0-49.9, adult [facility]) ICD-10-CM: E66.01, Z68.42  ICD-9-CM: 278.01, V85.42  8/25/2020 - Present        Anemia ICD-10-CM: D64.9  ICD-9-CM: 285. 9  Unknown - Present        Irregular menses ICD-10-CM: N92.6  ICD-9-CM: 626.4  Unknown - Present        Smoker ICD-10-CM: F17.200  ICD-9-CM: 305.1  8/25/2020 - Present        Back pain ICD-10-CM: M54.9  ICD-9-CM: 724.5  8/25/2020 - Present        Knee pain ICD-10-CM: M25.569  ICD-9-CM: 719.46  8/25/2020 - Present              Discharge Condition: Good    Hospital Course: The patient underwent  laparoscopic gastric bypass surgery  on 4/13/2021. The patient tolerated the procedure well. Vital signs remained stable and the patient was transferred to  3rd floor surgical unit without complications. The patient remained stable throughout the first night post operatively with stable vital signs and adequate urine output and pain control. Pain was controlled with Dilaudid IV and IV Tylenol . The patient on the first morning post operative was transferred to the radiology suite where they underwent a gastrograffin UGI study which showed no evidence of a leak or stricture. The drain was discontinued on POD # 1 and the patient was started on a bariatric liquid diet with protein shakes.  The patient progressed throughout the day and was ambulating well and tolerating their diet. They were therefore discharged home with instructions to notify me with any issues that may arise. Significant Diagnostic Studies:   Recent Labs     04/13/21  1455   HGB 11.1*     Recent Labs     04/13/21  1455   HCT 37.7       Current Discharge Medication List      CONTINUE these medications which have CHANGED    Details   enoxaparin (LOVENOX) 40 mg/0.4 mL 0.4 mL by SubCUTAneous route every twelve (12) hours every twelve (12) hours for 7 days. Indications: deep vein thrombosis prevention  Qty: 14 Syringe, Refills: 0    Comments: BMI: 45.43         CONTINUE these medications which have NOT CHANGED    Details   multivitamin (ONE A DAY) tablet Take 1 Tab by mouth daily. STOP taking these medications       acetaminophen (Tylenol Extra Strength) 500 mg tablet Comments:   Reason for Stopping:               Activity: activity as tolerated with no heavy lifting of greater than 20 pounds. No anti- inflammatory medications. Use stool softeners at home as needed while taking pain medications since they are constipating. Diet: Bariatric liquid diet    Wound Care: Keep wound clean and dry, Reinforce dressing PRN and ice to area for comfort. Do not get wound wet for 2 days.     Follow-up: 14 days with Dr Kirby Rascon M.D

## 2021-04-14 NOTE — DISCHARGE INSTRUCTIONS
[facility]  Nicole Wu. Dariela Blevins M.D., .A.C.S.  [address]. [address] [place] [address] Rd, [address] [facility]  Office: [phone]    Fax:  [phone]    Discharge Instruction for Gastric Bypass / Sleeve Gastrectomy Patients    Diet:   Continue with the liquid diet until you are seen in the office. Make sure you sip fluids all day. Aim for  Gms of protein every day. Activity:   Walking is encouraged. Rest when you are tired.  You may shower.  You may climb stairs. Take your time.  No lifting more than 10-15 lbs.  If you feel discomfort during an activity, rest.   Do not drive for 1 week. Wound Care:   Clean incisions with soap and water when in the shower. Pat dry.  Leave steri-strips on until they fall off.  A small amount of drainage may be present from the incisions. Contact the office if you notice an increase in drainage, an odor, increased redness, or fever > 100.5. Medications:   You will receive a prescription for pain medication at the time of discharge.  For upset stomach you may take over the counter medications such as Maalox, Mylanta, Pepcid, Zantac, or Tagamet.  You may take Tylenol   Non-aspirin based arthritis medications may be resumed after the first month.  Take a childrens or adult chewable multivitamin.  Milk of Magnesia will help with constipation.  It is fine to take your usual home medications. Blood pressure medications should be continued after surgery. Diabetic medications can frequently be reduced very quickly after surgery. Diabetics should continue to monitor blood sugars frequently after surgery and contact the prescribing physician for any questions. Follow-Up Appointment:   If you do not already have a follow-up appointment scheduled, contact the office in the next few days to obtain one. It is usually scheduled for 10-14 days after you surgery date. Office phone number:  [phone].         REV  09/2010

## 2021-04-14 NOTE — PROGRESS NOTES
Assumed care of patient. Patient is alert and oriented x 4. Patient is calm and cooperative. Patient denies numbness or tingling to all extermities. Capillary Refill less than 3 seconds. Lung sounds clear bilaterally. Respirations even and unlabored. No use of accessory muscles. Abdomen is soft and non-tender. Bowel sounds hypoactive to x4 quadrants. Patient has voided post-operatively since pelaez removal. No bladder distention evident. No complaints of bladder discomfort. Skin is warm , dry and skin color is appropriate to race. x5 trocar sites. no other skin integrity issues present. Sandoval hose applied to bilaterally. Sequential compression device applied to bilaterally. P thas a GAYATHRI drain that is charged, patent and draining. 18G IV intact to L hand and infusing without difficulty. 18G to R AC. Reports pain 7/10. Call bell within reach. Bed in low position. Three side rails up. 0735-Pt ambulating in hallway    0820-Pt ambulating in hallway    0840-Pt ambulated in room    0855-Called Dr. Maty Trinidad to inform that patient HR was 106. No new orders and  Is aware. 1019-Gave PRN morphine for complaints of pain. Pt stated pain was 10/10. Gave patient 6mg of morphine IV. Explanation of side effects given and opportunity for questions given. 1019-Gave PRN zofran for complaints of nausea. Gave patient 4mg of Zofran.

## 2021-04-15 ENCOUNTER — TELEPHONE (OUTPATIENT)
Dept: SURGERY | Age: 31
End: 2021-04-15

## 2021-04-15 NOTE — TELEPHONE ENCOUNTER
This RN spoke with patient post-operatively. Sipping: Patient is up to sipping 16 ounces. She awoke at 1000. Nausea and/or vomitting: None    Pain: Currently managed with Percocet and/or Tylenol    Lovenox injections: Administering every 12 hours, rotating sites. RN reminded patient to complete all injections, in which patient verbalized understanding. Lap sites: No erythema, drainage, and/or swelling    GAYATHRI drain site: No drainage; dressing will be changed today     BM: None to date, but is belching. Ambulation: Patient is walking throughout house every hour. IS: Patient continues to use 10x's per hour while awake. She has reached 1,000 mL. Temperature: 98.1 degrees    Pulse: 64 bpm     Medications: (confirmed currently taking)   *Multi-vitamin: yes   *Probiotic: yes   *Prilosec: No, but her mom will pick her up some and she will begin taking it. Questions: None    This RN reminded the patient to contact the office with any questions and/or concerns. RN also reminded patient they will receive another follow-up TC prior to the two week post-op follow-up appointment. Patient verbalized understanding to both. Patient's two week post-op visit is scheduled and was confirmed.

## 2021-04-19 ENCOUNTER — TELEPHONE (OUTPATIENT)
Dept: SURGERY | Age: 31
End: 2021-04-19

## 2021-04-19 NOTE — TELEPHONE ENCOUNTER
This RN spoke with patient post-operatively. Sipping: Patient is up to sipping 73 ounces. Nausea and/or vomitting: None    Pain: Currently managed with Percocet and/or Tylenol    Lovenox injections: Administering every 12 hours, rotating sites. RN reminded patient to complete all injections, in which patient verbalized understanding. Lap sites: No erythema, drainage, and/or swelling    GAYATHRI drain site: No drainage; dressing being changed    BM: Three since surgery    Ambulation: Patient is walking throughout house every hour. IS: Patient continues to use 10x's per hour while awake. Temperature: 98.8 degrees    Pulse: 80 bpm    Medications: (confirmed currently taking)   *Multi-vitamin: yes   *Probiotic: yes   *Prilosec: yes    Questions: None    This RN reminded the patient to contact the office with any questions and/or concerns. Patient verbalized understanding. Patient's two week post-op visit is scheduled and was confirmed.

## 2021-04-26 ENCOUNTER — TELEPHONE (OUTPATIENT)
Dept: SURGERY | Age: 31
End: 2021-04-26

## 2021-04-26 NOTE — TELEPHONE ENCOUNTER
Patient left this RN a message re: :rash from injections. \"  RN attempted to speak with patient about the above and had to leave a VM requesting a return call.

## 2021-04-26 NOTE — TELEPHONE ENCOUNTER
Patient contacted this RN, as she has broken out with a rash on her arm and leg. Patient sent this RN a picture, which appeared to be a typical post-surgical delayed reaction to anesthesia and/or pain medication. Patient completed her Lovenox injections, and she has not had any Percocet since her first day post-discharge. Patient is currently taking Benedryl Non-drowsy, and this RN recommended 1% Hydrocortisone Cream for the itching. RN recommended patient contact 911 should she feel as if her throat or mouth is swelling, and she verbalized understanding to all of the above.

## 2021-04-29 ENCOUNTER — OFFICE VISIT (OUTPATIENT)
Dept: SURGERY | Age: 31
End: 2021-04-29
Payer: MEDICAID

## 2021-04-29 VITALS
TEMPERATURE: 98.1 F | SYSTOLIC BLOOD PRESSURE: 130 MMHG | WEIGHT: 249.31 LBS | BODY MASS INDEX: 41.54 KG/M2 | HEIGHT: 65 IN | HEART RATE: 76 BPM | OXYGEN SATURATION: 99 % | DIASTOLIC BLOOD PRESSURE: 77 MMHG

## 2021-04-29 DIAGNOSIS — K75.81 STEATOHEPATITIS: ICD-10-CM

## 2021-04-29 DIAGNOSIS — Z09 POSTOPERATIVE EXAMINATION: Primary | ICD-10-CM

## 2021-04-29 PROBLEM — K90.9 INTESTINAL MALABSORPTION: Status: ACTIVE | Noted: 2021-04-29

## 2021-04-29 PROBLEM — Z98.84 S/P GASTRIC BYPASS: Status: ACTIVE | Noted: 2021-04-29

## 2021-04-29 PROCEDURE — 99024 POSTOP FOLLOW-UP VISIT: CPT | Performed by: NURSE PRACTITIONER

## 2021-04-29 RX ORDER — CHOLECALCIFEROL (VITAMIN D3) 50 MCG
CAPSULE ORAL
COMMUNITY
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
## 2021-04-30 RX ORDER — POTASSIUM CHLORIDE 7.45 MG/ML
10 INJECTION INTRAVENOUS ONCE
Status: CANCELLED | OUTPATIENT
Start: 2021-05-04 | End: 2021-05-04

## 2021-05-01 DIAGNOSIS — G89.18 POST-OP PAIN: Primary | ICD-10-CM

## 2021-05-01 RX ORDER — OXYCODONE AND ACETAMINOPHEN 5; 325 MG/1; MG/1
1 TABLET ORAL
Qty: 20 TAB | Refills: 0 | Status: SHIPPED | OUTPATIENT
Start: 2021-05-01 | End: 2021-05-05

## 2021-05-03 ENCOUNTER — HOSPITAL ENCOUNTER (OUTPATIENT)
Age: 31
Setting detail: OBSERVATION
Discharge: HOME | End: 2021-05-05
Attending: SPECIALIST | Admitting: SPECIALIST
Payer: MEDICAID

## 2021-05-03 ENCOUNTER — OFFICE VISIT (OUTPATIENT)
Dept: SURGERY | Age: 31
End: 2021-05-03
Payer: MEDICAID

## 2021-05-03 ENCOUNTER — TELEPHONE (OUTPATIENT)
Dept: SURGERY | Age: 31
End: 2021-05-03

## 2021-05-03 ENCOUNTER — HOSPITAL ENCOUNTER (OUTPATIENT)
Dept: GENERAL RADIOLOGY | Age: 31
Discharge: HOME | End: 2021-05-03
Attending: SPECIALIST
Payer: MEDICAID

## 2021-05-03 DIAGNOSIS — R10.84 GENERALIZED ABDOMINAL PAIN: ICD-10-CM

## 2021-05-03 DIAGNOSIS — R10.84 GENERALIZED ABDOMINAL PAIN: Primary | ICD-10-CM

## 2021-05-03 DIAGNOSIS — K56.600 PARTIAL SMALL BOWEL OBSTRUCTION (HCC): Primary | ICD-10-CM

## 2021-05-03 PROCEDURE — 99214 OFFICE O/P EST MOD 30 MIN: CPT | Performed by: SPECIALIST

## 2021-05-03 PROCEDURE — 96376 TX/PRO/DX INJ SAME DRUG ADON: CPT

## 2021-05-03 PROCEDURE — 96375 TX/PRO/DX INJ NEW DRUG ADDON: CPT

## 2021-05-03 PROCEDURE — 74011000258 HC RX REV CODE- 258: Performed by: SPECIALIST

## 2021-05-03 PROCEDURE — 96374 THER/PROPH/DIAG INJ IV PUSH: CPT

## 2021-05-03 PROCEDURE — 74018 RADEX ABDOMEN 1 VIEW: CPT

## 2021-05-03 PROCEDURE — 65270000029 HC RM PRIVATE

## 2021-05-03 PROCEDURE — 74011250636 HC RX REV CODE- 250/636: Performed by: SPECIALIST

## 2021-05-03 PROCEDURE — 99218 HC RM OBSERVATION: CPT

## 2021-05-03 RX ORDER — DEXTROSE, SODIUM CHLORIDE, SODIUM LACTATE, POTASSIUM CHLORIDE, AND CALCIUM CHLORIDE 5; .6; .31; .03; .02 G/100ML; G/100ML; G/100ML; G/100ML; G/100ML
150 INJECTION, SOLUTION INTRAVENOUS CONTINUOUS
Status: DISCONTINUED | OUTPATIENT
Start: 2021-05-03 | End: 2021-05-05 | Stop reason: HOSPADM

## 2021-05-03 RX ORDER — MORPHINE SULFATE 10 MG/ML
6 INJECTION, SOLUTION INTRAMUSCULAR; INTRAVENOUS
Status: DISCONTINUED | OUTPATIENT
Start: 2021-05-03 | End: 2021-05-05 | Stop reason: HOSPADM

## 2021-05-03 RX ADMIN — SODIUM CHLORIDE, SODIUM LACTATE, POTASSIUM CHLORIDE, CALCIUM CHLORIDE, AND DEXTROSE MONOHYDRATE 150 ML/HR: 600; 310; 30; 20; 5 INJECTION, SOLUTION INTRAVENOUS at 19:20

## 2021-05-03 RX ADMIN — PROMETHAZINE HYDROCHLORIDE 12.5 MG: 25 INJECTION INTRAMUSCULAR; INTRAVENOUS at 19:28

## 2021-05-03 RX ADMIN — MORPHINE SULFATE 6 MG: 10 INJECTION INTRAVENOUS at 19:54

## 2021-05-03 RX ADMIN — MORPHINE SULFATE 6 MG: 10 INJECTION INTRAVENOUS at 23:00

## 2021-05-03 NOTE — PROGRESS NOTES
1745  Pt arrived to Rm 204 via wheelchair. Pt is a Direct Admit from Dr Jim Griffiths office. admitted with Dx of Food Impaction. Pt is s/p Gastric Bypass on 4/13/2021 Pt is awake, alert, oriented x4. Lungs are clear. Abdomen is obese, tender to touch on left side of abdomen. Pt has 1 steristrip to left  Abdomen  and old Lap sites to abdomen that are healing. .  Pt ambulating ad edel. Pt voided 200 ml of geoffrey urine in BR. Pt has pain to left abdomen with pain level  of 10/10.   1830   No orders on chart yet. Obtained order from Dr Adriana Park for IVF D5LR at 150 ml/hr,  Morphine 6 mg IV q 3 hrs PRN for pain, and Phenergan 12.5 mg IVPB q 3 hrs PRN for nausea. Dr Adriana Park will place the rest of the orders.
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
## 2021-05-03 NOTE — ROUTINE PROCESS
1930  Bedside and Verbal shift change report given to Kiesha Olguin RN (oncoming nurse) by Ronit Sim RN (offgoing nurse). Report included the following information SBAR, Kardex and MAR.

## 2021-05-04 ENCOUNTER — APPOINTMENT (OUTPATIENT)
Dept: GENERAL RADIOLOGY | Age: 31
End: 2021-05-04
Attending: SPECIALIST
Payer: MEDICAID

## 2021-05-04 ENCOUNTER — HOSPITAL ENCOUNTER (OUTPATIENT)
Dept: INFUSION THERAPY | Age: 31
Discharge: HOME | End: 2021-05-04

## 2021-05-04 PROBLEM — R11.10 VOMITING: Status: ACTIVE | Noted: 2021-05-04

## 2021-05-04 LAB
ANION GAP SERPL CALC-SCNC: 7 MMOL/L (ref 3–18)
BUN SERPL-MCNC: 4 MG/DL (ref 7–18)
BUN/CREAT SERPL: 11 (ref 12–20)
CALCIUM SERPL-MCNC: 8.7 MG/DL (ref 8.5–10.1)
CHLORIDE SERPL-SCNC: 103 MMOL/L (ref 100–111)
CO2 SERPL-SCNC: 28 MMOL/L (ref 21–32)
CREAT SERPL-MCNC: 0.37 MG/DL (ref 0.6–1.3)
GLUCOSE SERPL-MCNC: 115 MG/DL (ref 74–99)
MAGNESIUM SERPL-MCNC: 1.9 MG/DL (ref 1.6–2.6)
POTASSIUM SERPL-SCNC: 3.2 MMOL/L (ref 3.5–5.5)
SODIUM SERPL-SCNC: 138 MMOL/L (ref 136–145)

## 2021-05-04 PROCEDURE — 96376 TX/PRO/DX INJ SAME DRUG ADON: CPT

## 2021-05-04 PROCEDURE — 99218 HC RM OBSERVATION: CPT

## 2021-05-04 PROCEDURE — 74011000250 HC RX REV CODE- 250: Performed by: SPECIALIST

## 2021-05-04 PROCEDURE — 96375 TX/PRO/DX INJ NEW DRUG ADDON: CPT

## 2021-05-04 PROCEDURE — 74018 RADEX ABDOMEN 1 VIEW: CPT

## 2021-05-04 PROCEDURE — 74011250637 HC RX REV CODE- 250/637: Performed by: SPECIALIST

## 2021-05-04 PROCEDURE — 80048 BASIC METABOLIC PNL TOTAL CA: CPT

## 2021-05-04 PROCEDURE — 36415 COLL VENOUS BLD VENIPUNCTURE: CPT

## 2021-05-04 PROCEDURE — 83735 ASSAY OF MAGNESIUM: CPT

## 2021-05-04 PROCEDURE — 74011000258 HC RX REV CODE- 258: Performed by: SPECIALIST

## 2021-05-04 PROCEDURE — 74011250636 HC RX REV CODE- 250/636: Performed by: NURSE PRACTITIONER

## 2021-05-04 PROCEDURE — 74011250636 HC RX REV CODE- 250/636: Performed by: SPECIALIST

## 2021-05-04 RX ORDER — POTASSIUM CHLORIDE 7.45 MG/ML
10 INJECTION INTRAVENOUS
Status: COMPLETED | OUTPATIENT
Start: 2021-05-04 | End: 2021-05-04

## 2021-05-04 RX ORDER — ENOXAPARIN SODIUM 100 MG/ML
40 INJECTION SUBCUTANEOUS DAILY
Status: DISCONTINUED | OUTPATIENT
Start: 2021-05-04 | End: 2021-05-04

## 2021-05-04 RX ORDER — POTASSIUM CHLORIDE 7.45 MG/ML
10 INJECTION INTRAVENOUS ONCE
Status: COMPLETED | OUTPATIENT
Start: 2021-05-04 | End: 2021-05-04

## 2021-05-04 RX ORDER — FACIAL-BODY WIPES
10 EACH TOPICAL DAILY PRN
Status: DISCONTINUED | OUTPATIENT
Start: 2021-05-04 | End: 2021-05-05 | Stop reason: HOSPADM

## 2021-05-04 RX ADMIN — FAMOTIDINE 20 MG: 10 INJECTION, SOLUTION INTRAVENOUS at 08:19

## 2021-05-04 RX ADMIN — MORPHINE SULFATE 6 MG: 10 INJECTION INTRAVENOUS at 21:03

## 2021-05-04 RX ADMIN — SODIUM CHLORIDE, SODIUM LACTATE, POTASSIUM CHLORIDE, CALCIUM CHLORIDE, AND DEXTROSE MONOHYDRATE 150 ML/HR: 600; 310; 30; 20; 5 INJECTION, SOLUTION INTRAVENOUS at 02:00

## 2021-05-04 RX ADMIN — BISACODYL 10 MG: 10 SUPPOSITORY RECTAL at 15:39

## 2021-05-04 RX ADMIN — MORPHINE SULFATE 6 MG: 10 INJECTION INTRAVENOUS at 08:20

## 2021-05-04 RX ADMIN — SODIUM CHLORIDE, SODIUM LACTATE, POTASSIUM CHLORIDE, CALCIUM CHLORIDE, AND DEXTROSE MONOHYDRATE 150 ML/HR: 600; 310; 30; 20; 5 INJECTION, SOLUTION INTRAVENOUS at 21:07

## 2021-05-04 RX ADMIN — MORPHINE SULFATE 6 MG: 10 INJECTION INTRAVENOUS at 05:17

## 2021-05-04 RX ADMIN — POTASSIUM CHLORIDE 10 MEQ: 7.46 INJECTION, SOLUTION INTRAVENOUS at 15:02

## 2021-05-04 RX ADMIN — MORPHINE SULFATE 6 MG: 10 INJECTION INTRAVENOUS at 02:00

## 2021-05-04 RX ADMIN — MORPHINE SULFATE 6 MG: 10 INJECTION INTRAVENOUS at 11:38

## 2021-05-04 RX ADMIN — PROMETHAZINE HYDROCHLORIDE 12.5 MG: 25 INJECTION INTRAMUSCULAR; INTRAVENOUS at 15:40

## 2021-05-04 RX ADMIN — FAMOTIDINE 20 MG: 10 INJECTION, SOLUTION INTRAVENOUS at 21:03

## 2021-05-04 RX ADMIN — POTASSIUM CHLORIDE 10 MEQ: 7.46 INJECTION, SOLUTION INTRAVENOUS at 13:23

## 2021-05-04 RX ADMIN — POTASSIUM CHLORIDE 10 MEQ: 7.46 INJECTION, SOLUTION INTRAVENOUS at 12:10

## 2021-05-04 RX ADMIN — MORPHINE SULFATE 6 MG: 10 INJECTION INTRAVENOUS at 17:00

## 2021-05-04 RX ADMIN — POTASSIUM CHLORIDE 10 MEQ: 7.46 INJECTION, SOLUTION INTRAVENOUS at 09:23

## 2021-05-04 NOTE — PROGRESS NOTES
Problem: Falls - Risk of  Goal: *Absence of Falls  Description: Document Burrell Canavan Fall Risk and appropriate interventions in the flowsheet.   Outcome: Progressing Towards Goal  Note: Fall Risk Interventions:  Mobility Interventions: Patient to call before getting OOB         Medication Interventions: Teach patient to arise slowly    Elimination Interventions: Call light in reach

## 2021-05-04 NOTE — PROGRESS NOTES
Oral and Written notification given to patient and/or caregiver informing them that they are currently an Outpatient receiving care in our facility. Outpatient services include Observation Services. Noted patient had previous gastric procedure, c/o abd pain; care manager available to assist as needed.

## 2021-05-04 NOTE — PROGRESS NOTES
Oral and Written notification given to patient and/or caregiver informing them that they are currently an Outpatient receiving care in our facility. Outpatient services include Observation Services under [facility] and [facility] requirements.

## 2021-05-04 NOTE — PROGRESS NOTES
1954 - Head to toe assessment completed at this time. Pt denies any chest pain or SOB. Pt does have pain in the left abdominal area. Pt lungs sounds are clear and bowel sounds are active. Pt educated and encouraged to manage pain and to ambulate. Pt left with call light within reach, bed in the low position, and wheels locked. Will continue to monitor. 2221 - Followed up with Dr. Babita Villa in reference to orders for pt. He stated that he would place orders. Shift summary - Pt pain managed with prn medication per MAR. Pt informed about all medications and side effects and encouraged to ask questions about medication. Pt left with no signs of distress and any concerns of pt addressed.

## 2021-05-04 NOTE — NURSE NAVIGATOR
Patient alert this visit. She is tolerating a liquid diet well. Her pain to her left side is \"better than previously. \"  She continues with nausea. No BM since Saturday. Lovenox gave patient a rash post-operatively, so she refused administration. RN requested from Dr. Christi Mejía a stool softener, as well as discontinuing Lovenox and ordering an SCD machine. Patient is aware if it is discontinued, she would need to ambulate hourly. Potassium completed.

## 2021-05-04 NOTE — PROGRESS NOTES
Problem: Falls - Risk of  Goal: *Absence of Falls  Description: Document Bing Ulrich Fall Risk and appropriate interventions in the flowsheet.   Outcome: Progressing Towards Goal  Note: Fall Risk Interventions:  Mobility Interventions: Patient to call before getting OOB         Medication Interventions: Patient to call before getting OOB, Teach patient to arise slowly    Elimination Interventions: Call light in reach

## 2021-05-04 NOTE — ROUTINE PROCESS
Bedside and Verbal shift change report given to JOSY Brown RN (oncoming nurse) by VIKA Kumar RN (offgoing nurse). Report included the following information SBAR, Kardex, Intake/Output, MAR and Recent Results.

## 2021-05-05 VITALS
OXYGEN SATURATION: 98 % | DIASTOLIC BLOOD PRESSURE: 72 MMHG | RESPIRATION RATE: 18 BRPM | HEART RATE: 95 BPM | TEMPERATURE: 97.6 F | SYSTOLIC BLOOD PRESSURE: 120 MMHG

## 2021-05-05 LAB
ANION GAP SERPL CALC-SCNC: 7 MMOL/L (ref 3–18)
BUN SERPL-MCNC: 2 MG/DL (ref 7–18)
BUN/CREAT SERPL: 5 (ref 12–20)
CALCIUM SERPL-MCNC: 9.1 MG/DL (ref 8.5–10.1)
CHLORIDE SERPL-SCNC: 105 MMOL/L (ref 100–111)
CO2 SERPL-SCNC: 27 MMOL/L (ref 21–32)
CREAT SERPL-MCNC: 0.41 MG/DL (ref 0.6–1.3)
GLUCOSE SERPL-MCNC: 124 MG/DL (ref 74–99)
MAGNESIUM SERPL-MCNC: 1.7 MG/DL (ref 1.6–2.6)
POTASSIUM SERPL-SCNC: 3.5 MMOL/L (ref 3.5–5.5)
SODIUM SERPL-SCNC: 139 MMOL/L (ref 136–145)

## 2021-05-05 PROCEDURE — 83735 ASSAY OF MAGNESIUM: CPT

## 2021-05-05 PROCEDURE — 96376 TX/PRO/DX INJ SAME DRUG ADON: CPT

## 2021-05-05 PROCEDURE — 74011250636 HC RX REV CODE- 250/636: Performed by: SPECIALIST

## 2021-05-05 PROCEDURE — 99218 HC RM OBSERVATION: CPT

## 2021-05-05 PROCEDURE — 36415 COLL VENOUS BLD VENIPUNCTURE: CPT

## 2021-05-05 PROCEDURE — 80048 BASIC METABOLIC PNL TOTAL CA: CPT

## 2021-05-05 RX ADMIN — MORPHINE SULFATE 6 MG: 10 INJECTION INTRAVENOUS at 06:14

## 2021-05-05 RX ADMIN — FAMOTIDINE 20 MG: 10 INJECTION, SOLUTION INTRAVENOUS at 10:06

## 2021-05-05 RX ADMIN — SODIUM CHLORIDE, SODIUM LACTATE, POTASSIUM CHLORIDE, CALCIUM CHLORIDE, AND DEXTROSE MONOHYDRATE 150 ML/HR: 600; 310; 30; 20; 5 INJECTION, SOLUTION INTRAVENOUS at 04:03

## 2021-05-05 RX ADMIN — MORPHINE SULFATE 6 MG: 10 INJECTION INTRAVENOUS at 00:13

## 2021-05-05 RX ADMIN — MORPHINE SULFATE 6 MG: 10 INJECTION INTRAVENOUS at 03:05

## 2021-05-05 RX ADMIN — MORPHINE SULFATE 6 MG: 10 INJECTION INTRAVENOUS at 10:05

## 2021-05-05 NOTE — DISCHARGE INSTRUCTIONS
Patient Education   Patient Education        Gastric Sleeve Surgery: What to Expect at Home  Your Recovery  Sleeve gastrectomy is surgery to remove part of the stomach to help with weight loss. The surgery, which is also called gastric sleeve surgery, limits the amount of food your stomach can hold. You will have some belly pain. You may need pain medicine for the first week or so after surgery. The cuts (incisions) that the doctor made may be tender and sore. Because the surgery makes your stomach smaller, you will get full more quickly when you eat. It's important to think of this surgery as a tool to help you lose weight. It's not an instant fix. You will still need to eat a healthy diet and get regular exercise. This will help you reach your weight goal and avoid regaining the weight you lose. It's common to have many different emotions after this surgery. You may feel happy or excited as you begin to lose weight. But you may also feel overwhelmed or frustrated by the changes that you have to make in your diet, activity, and lifestyle. Talk with your doctor if you have concerns or questions. This care sheet gives you a general idea about how long it will take for you to recover. But each person recovers at a different pace. Follow the steps below to get better as quickly as possible. How can you care for yourself at home? Activity    · Rest when you feel tired. Getting enough sleep will help you recover.     · Try to walk each day. Start out by walking a little more than you did the day before. Bit by bit, increase the amount you walk. Walking boosts blood flow and helps prevent pneumonia and constipation.     · Avoid lifting anything that would make you strain.  This may include heavy grocery bags and milk containers, a heavy briefcase or backpack, cat litter or dog food bags, a vacuum , or a child.     · Avoid strenuous activities, such as bicycle riding, jogging, weight lifting, or aerobic exercise, until your doctor says it is okay. Do not take part in any activity where you could be hit in the belly. This could be sports or playing with children.     · Hold a pillow over your incision when you cough or take deep breaths. This will support your belly and decrease your pain.     · Do breathing exercises at home as instructed by your doctor. This will help prevent pneumonia.     · You can shower. Pat the incision dry. Do not take a bath for the first 2 weeks, or until your doctor tells you it is okay.     · You may drive when you are no longer taking prescription pain medicine and can quickly move your foot from the gas pedal to the brake. You must also be able to sit comfortably for a long period of time, even if you do not plan to go far. You might get caught in traffic.     · You will probably need to take 2 to 4 weeks off from work. It depends on the type of work you do and how you feel.     · Ask your doctor when it is okay for you to have sex. Diet    · Your doctor will give you specific instructions about what to eat after the surgery. For the first 2 to 6 weeks, you will need to follow a liquid or soft diet. Bit by bit, you will be able to add solid foods back into your diet.     · Have 5 or 6 small meals each day instead of 2 or 3 large meals. Chew each bite of food very well. Eat slowly. You may need to take 20 to 30 minutes to eat a meal.     · Avoid crusty breads, bagels, tough meats, raw vegetables, nuts and seeds (including crackers and breads that have nuts and seeds), and other foods that are hard to digest. If you feel full quickly, try to drink fluids between meals instead of with meals.     · Avoid fizzy drinks, such as soda pop.     · Avoid drinking with straws. This may help you swallow less air when you drink.     · Check with your doctor before drinking alcohol.  Your body may absorb alcohol more quickly after surgery.     · You may notice that your bowel movements are not regular right after your surgery. This is common. Try to avoid constipation and straining with bowel movements. Take a fiber supplement every day. If you have not had a bowel movement after a couple of days, ask your doctor about taking a mild laxative. Medicines    · Your doctor will tell you if and when you can restart your medicines. He or she will also give you instructions about taking any new medicines.     · If you take aspirin or some other blood thinner, ask your doctor if and when to start taking it again. Make sure that you understand exactly what your doctor wants you to do.     · Take pain medicines exactly as directed. ? If the doctor gave you a prescription medicine for pain, take it as prescribed. ? Do not take two or more pain medicines at the same time unless the doctor told you to. Many pain medicines contain acetaminophen, which is Tylenol. Too much acetaminophen (Tylenol) can be harmful. ? Do not take aspirin (Tawana, Bufferin), ibuprofen (Advil, Motrin), or naproxen (Aleve) until your doctor says it is okay.     · If you think your pain medicine is making you sick to your stomach:  ? Take your medicine after meals (unless your doctor has told you not to). ? Ask your doctor for a different pain medicine.     · If your doctor prescribed antibiotics, take them as directed. Do not stop taking them just because you feel better. You need to take the full course of antibiotics. Incision care    · If you have strips of tape on the incision, leave the tape on for a week or until it falls off.     · Wash the area daily with warm, soapy water and pat it dry. Don't use hydrogen peroxide or alcohol, which can slow healing. You may cover the area with a gauze bandage if it weeps or rubs against clothing. Change the bandage every day.     · Keep the area clean and dry. Follow-up care is a key part of your treatment and safety.  Be sure to make and go to all appointments, and call your doctor if you are having problems. It's also a good idea to know your test results and keep a list of the medicines you take. When should you call for help? Call 911 anytime you think you may need emergency care. For example, call if:    · You passed out (lost consciousness).     · You are short of breath. Call your doctor now or seek immediate medical care if:    · You have pain that does not get better after you take pain medicine.     · You cannot pass stool or gas.     · You are sick to your stomach and cannot drink fluids.     · You have loose stitches, or your incision comes open.     · You have signs of a blood clot, such as:  ? Pain in your calf, back of the knee, thigh, or groin. ? Redness and swelling in your leg or groin.     · You have signs of infection, such as:  ? Increased pain, swelling, warmth, or redness. ? Red streaks leading from the incision. ? Pus draining from the incision. ? A fever. Watch closely for changes in your health, and be sure to contact your doctor if you have any problems. Where can you learn more? Go to http://www.gray.com/  Enter L324 in the search box to learn more about \"Gastric Sleeve Surgery: What to Expect at Home. \"  Current as of: September 23, 2020               Content Version: 12.8  © 2006-2021 [employer]. Care instructions adapted under license by [employer] (which disclaims liability or warranty for this information). If you have questions about a medical condition or this instruction, always ask your healthcare professional. [place] [address] any warranty or liability for your use of this information. Nausea and Vomiting: Care Instructions  Your Care Instructions     When you are nauseated, you may feel weak and sweaty and notice a lot of saliva in your mouth. Nausea often leads to vomiting.  Most of the time you do not need to worry about nausea and vomiting, but they can be signs of other illnesses. Two common causes of nausea and vomiting are stomach flu and food poisoning. Nausea and vomiting from viral stomach flu will usually start to improve within 24 hours. Nausea and vomiting from food poisoning may last from 12 to 48 hours. The doctor has checked you carefully, but problems can develop later. If you notice any problems or new symptoms, get medical treatment right away. Follow-up care is a key part of your treatment and safety. Be sure to make and go to all appointments, and call your doctor if you are having problems. It's also a good idea to know your test results and keep a list of the medicines you take. How can you care for yourself at home? · To prevent dehydration, drink plenty of fluids. Choose water and other caffeine-free clear liquids until you feel better. If you have kidney, heart, or liver disease and have to limit fluids, talk with your doctor before you increase the amount of fluids you drink. · Rest in bed until you feel better. · When you are able to eat, try clear soups, mild foods, and liquids until all symptoms are gone for 12 to 48 hours. Other good choices include dry toast, crackers, cooked cereal, and gelatin dessert, such as Jell-O. When should you call for help? Call 911 anytime you think you may need emergency care. For example, call if:    · You passed out (lost consciousness). Call your doctor now or seek immediate medical care if:    · You have symptoms of dehydration, such as:  ? Dry eyes and a dry mouth. ? Passing only a little urine. ? Feeling thirstier than usual.     · You have new or worsening belly pain.     · You have a new or higher fever.     · You vomit blood or what looks like coffee grounds.    Watch closely for changes in your health, and be sure to contact your doctor if:    · You have ongoing nausea and vomiting.     · Your vomiting is getting worse.     · Your vomiting lasts longer than 2 days.     · You are not getting better as expected. Where can you learn more? Go to http://www.[employer].com/  Enter H591 in the search box to learn more about \"Nausea and Vomiting: Care Instructions. \"  Current as of: February 26, 2020               Content Version: 12.8  © 3267-8070 [employer]. Care instructions adapted under license by [employer] (which disclaims liability or warranty for this information). If you have questions about a medical condition or this instruction, always ask your healthcare professional. [place] [address] any warranty or liability for your use of this information.

## 2021-05-05 NOTE — PROGRESS NOTES
BARIATRIC SURGERY PROGRESS NOTE      Name: Ingrid Balbuena MRN: [id number]   : 1990 Hospital: [facility] [place]   Date: 2021  Admission Date: 5/3/2021     Chart and notes reviewed. Data reviewed. I have evaluated and examined the patient. She seems to feel better. Tolerating ice chips without emesis    Patient Active Problem List   Diagnosis Code    Morbid obesity ([facility] E66.01    Morbid obesity with BMI of 45.0-49.9, adult ([facility] E66.01, Z68.42    Anemia D64.9    Irregular menses N92.6    Smoker F17.200    Back pain M54.9    Knee pain M25.569    Former smoker Z87.891    Intestinal malabsorption K90.9    S/P gastric bypass Z98.84    Vomiting R11.10       Vital Signs:  Visit Vitals  /78   Pulse 82   Temp 98 °F (36.7 °C)   Resp 18   SpO2 100%               Temp (24hrs), Av °F (36.7 °C), Min:97.5 °F (36.4 °C), Max:98.5 °F (36.9 °C)       Intake/Output:   Last shift:      No intake/output data recorded. Last 3 shifts:  07 -  1900  In: 1000 [P.O.:150; I.V.:850]  Out: 1300 [Urine:1300]    Intake/Output Summary (Last 24 hours) at 2021 2228  Last data filed at 2021 1900  Gross per 24 hour   Intake 1000 ml   Output 1100 ml   Net -100 ml            Physical Exam:    General: in no apparent distress   Chest: normal   Lungs: normal air entry   Heart: Regular rate and rhythm   Abdomen: abdomen is soft without significant tenderness, masses, organomegaly or guarding   Extremity: negative   Neuro: alert      DATA:  MAR reviewed and pertinent medications noted or modified as needed    Labs:  No results for input(s): WBC, HGB, HCT, PLT, HGBEXT, HCTEXT, PLTEXT in the last 72 hours. Recent Labs     21  0725      K 3.2*      CO2 28   *   BUN 4*   CREA 0.37*   CA 8.7   MG 1.9     No results for input(s): PH, PCO2, PO2, HCO3, FIO2 in the last 72 hours.     Imaging:  [x]                           I have personally reviewed the patients radiographs and reports      [x]      Time spent with patient exclusive of procedures  15    Minutes    IMPRESSION:   · Food impaction at small bowel anastomosis now resolving      PLAN:   · Advance diet slowly  · Correct K         Patricia Watkins MD

## 2021-05-05 NOTE — PROGRESS NOTES
BARIATRIC SURGERY PROGRESS NOTE      Name: Conrad Romero MRN: [id number]   : 1990 Hospital: [facility] [place]   Date: 2021  Admission Date: 5/3/2021     Chart and notes reviewed. Data reviewed. I have evaluated and examined the patient. Feels normal now, tolerating diet. Patient Active Problem List   Diagnosis Code    Morbid obesity ([facility] E66.01    Morbid obesity with BMI of 45.0-49.9, adult ([facility] E66.01, Z68.42    Anemia D64.9    Irregular menses N92.6    Smoker F17.200    Back pain M54.9    Knee pain M25.569    Former smoker Z87.891    Intestinal malabsorption K90.9    S/P gastric bypass Z98.84    Vomiting R11.10       Vital Signs:  Visit Vitals  /72   Pulse 95   Temp 97.6 °F (36.4 °C)   Resp 18   SpO2 98%               Temp (24hrs), Av.9 °F (36.6 °C), Min:97.5 °F (36.4 °C), Max:98.7 °F (37.1 °C)       Intake/Output:   Last shift:      No intake/output data recorded. Last 3 shifts:  190 -  0700  In: 1990 [P.O.:1140; I.V.:850]  Out: 1400 [Urine:1400]    Intake/Output Summary (Last 24 hours) at 2021 8149  Last data filed at 2021 0650  Gross per 24 hour   Intake 1990 ml   Output 1100 ml   Net 890 ml            Physical Exam:    General: in no apparent distress   Chest: normal   Lungs: normal air entry   Heart: Regular rate and rhythm   Abdomen: abdomen is soft without significant tenderness, masses, organomegaly or guarding   Extremity: negative   Neuro: alert      DATA:  MAR reviewed and pertinent medications noted or modified as needed    Labs:  No results for input(s): WBC, HGB, HCT, PLT, HGBEXT, HCTEXT, PLTEXT in the last 72 hours. Recent Labs     21  0210 21  0725    138   K 3.5 3.2*    103   CO2 27 28   * 115*   BUN 2* 4*   CREA 0.41* 0.37*   CA 9.1 8.7   MG 1.7 1.9     No results for input(s): PH, PCO2, PO2, HCO3, FIO2 in the last 72 hours.     Imaging:  [x]                           I have personally reviewed the patients radiographs and reports      [x]      Time spent with patient exclusive of procedures  15     Minutes    IMPRESSION:   · Resolved food impaction      PLAN:   · D/C home         Jean Marie Chris MD

## 2021-05-05 NOTE — ROUTINE PROCESS
Bedside and Verbal shift change report given to CJ Rasheed RN (oncoming nurse) by Delmy Villanueva. Nolberto Duong RN (offgoing nurse). Report included the following information SBAR, Kardex, Intake/Output, MAR and Recent Results.

## 2021-05-05 NOTE — PROGRESS NOTES
2002 - Head to toe assessment completed at this time. Pt bowel sounds active in all quadrants. Pt lungs sounds clear. Pt stated that she has pain in the left side of abdomen. Pt educated on the importance of ambulation. Pt educated on the side effects of medication and pain management. Pt left with call light within reach, bed in the low position, and wheels locked. Shift summary - Pt pain managed with prn medication per MAR. Pt informed about all medications and side effects and encouraged to ask questions about medication. Pt encouraged throughout the night to use incentive spirometer and the purpose of the incentive spirometer. Pt left with no signs of distress and any concerns of pt addressed.

## 2021-05-05 NOTE — PROGRESS NOTES
0725  Bedside shift change report given to Cesia GUEVARA RN (oncoming nurse) by Clint Hernandez (offgoing nurse). Report included the following information SBAR, Kardex, Intake/Output and MAR.     0815  Assumed care at this time. Patient alert and oriented x4. Denies SOB, chest pain. Shows no signs of distress. Patient lungs clear bilaterally. Cap refill < 3 sec to all extremities. Patient has 22 G IV to R [facility] [place] by [facility]I. Stated pain 9/10. Abdomen has 4 healing surgical sites (steristrip on one) is CDI. Bowel sounds present. Passing gas. Skin intact. Patient call light and possessions within reach. Bed locked and in lowest position. Nurse will continue to monitor. 1100  Notified that patient is allergic to lovenox. Placed SCDs. 1922  Bedside and verbal shift change report given to Clint Hernandez by Chris Ibarra RN. Report included SBAR, kardex, MAR, and recent results. Patient alert and oriented x4. Patient had uneventful shift. Ambulating and voiding sufficient amounts. Pain controlled by prn medications. Patient has not yet had a BM. Suppository given at 1530.

## 2021-05-05 NOTE — PROGRESS NOTES
Problem: Falls - Risk of  Goal: *Absence of Falls  Description: Document Gardenia Noguera Fall Risk and appropriate interventions in the flowsheet.   Outcome: Progressing Towards Goal  Note: Fall Risk Interventions:  Mobility Interventions: Patient to call before getting OOB         Medication Interventions: Teach patient to arise slowly, Patient to call before getting OOB    Elimination Interventions: Call light in reach

## 2021-05-05 NOTE — PROGRESS NOTES
1058-Discharge instructions reviewed with patient at this time. Opportunity for questions and clarification was provided. Patient has verbalized understanding. Patient was provided with care notes to include side effects of RX's. Arm bands removed and shredded. AVS reviewed with Comfort [place] RN. IV removed. Dressing CDI.

## 2021-05-06 ENCOUNTER — TELEPHONE (OUTPATIENT)
Dept: SURGERY | Age: 31
End: 2021-05-06

## 2021-05-06 NOTE — TELEPHONE ENCOUNTER
RN followed-up post-discharge. She had a BM at the hospital.  She has minimal nausea. Patient denied abdominal pain. She is tolerating her full liquid and will continue it for two days and begin a soft/puree diet thereafter. Patient will contact the office with further questions and/or concerns. Upcoming appointment confirmed.

## 2021-05-07 VITALS
BODY MASS INDEX: 41.63 KG/M2 | DIASTOLIC BLOOD PRESSURE: 68 MMHG | SYSTOLIC BLOOD PRESSURE: 128 MMHG | RESPIRATION RATE: 16 BRPM | HEIGHT: 65 IN | TEMPERATURE: 97.3 F | HEART RATE: 95 BPM | OXYGEN SATURATION: 99 % | WEIGHT: 249.9 LBS

## 2021-05-07 PROBLEM — K56.600 PARTIAL SMALL BOWEL OBSTRUCTION (HCC): Chronic | Status: ACTIVE | Noted: 2021-05-07

## 2021-05-07 PROBLEM — K56.600 PARTIAL SMALL BOWEL OBSTRUCTION (HCC): Status: ACTIVE | Noted: 2021-05-07

## 2021-05-18 ENCOUNTER — APPOINTMENT (OUTPATIENT)
Dept: SURGERY | Age: 31
End: 2021-05-18

## 2021-05-18 ENCOUNTER — ANESTHESIA EVENT (OUTPATIENT)
Dept: SURGERY | Age: 31
End: 2021-05-18
Payer: MEDICAID

## 2021-05-18 ENCOUNTER — HOSPITAL ENCOUNTER (OUTPATIENT)
Age: 31
Setting detail: OUTPATIENT SURGERY
Discharge: HOME | End: 2021-05-18
Attending: SPECIALIST | Admitting: SPECIALIST
Payer: MEDICAID

## 2021-05-18 ENCOUNTER — ANESTHESIA (OUTPATIENT)
Dept: SURGERY | Age: 31
End: 2021-05-18
Payer: MEDICAID

## 2021-05-18 VITALS
HEIGHT: 65 IN | WEIGHT: 240.13 LBS | HEART RATE: 82 BPM | OXYGEN SATURATION: 100 % | RESPIRATION RATE: 18 BRPM | DIASTOLIC BLOOD PRESSURE: 63 MMHG | TEMPERATURE: 98.7 F | SYSTOLIC BLOOD PRESSURE: 118 MMHG | BODY MASS INDEX: 40.01 KG/M2

## 2021-05-18 DIAGNOSIS — K28.9 ANASTOMOTIC ULCER: ICD-10-CM

## 2021-05-18 LAB
COVID-19 RAPID TEST, COVR: NOT DETECTED
HCG UR QL: NEGATIVE
SOURCE, COVRS: NORMAL

## 2021-05-18 PROCEDURE — 77030031670 HC DEV INFL ENDOTEK BIG60 MRTM -B: Performed by: SPECIALIST

## 2021-05-18 PROCEDURE — 74011250636 HC RX REV CODE- 250/636: Performed by: NURSE ANESTHETIST, CERTIFIED REGISTERED

## 2021-05-18 PROCEDURE — 74011250636 HC RX REV CODE- 250/636: Performed by: SPECIALIST

## 2021-05-18 PROCEDURE — 76210000026 HC REC RM PH II 1 TO 1.5 HR: Performed by: SPECIALIST

## 2021-05-18 PROCEDURE — 77030040361 HC SLV COMPR DVT MDII -B: Performed by: SPECIALIST

## 2021-05-18 PROCEDURE — 81025 URINE PREGNANCY TEST: CPT

## 2021-05-18 PROCEDURE — 43235 EGD DIAGNOSTIC BRUSH WASH: CPT | Performed by: SPECIALIST

## 2021-05-18 PROCEDURE — 77030039961 HC KT CUST COLON BSC -D: Performed by: SPECIALIST

## 2021-05-18 PROCEDURE — 74011000250 HC RX REV CODE- 250: Performed by: NURSE ANESTHETIST, CERTIFIED REGISTERED

## 2021-05-18 PROCEDURE — 76010000154 HC OR TIME FIRST 0.5 HR: Performed by: SPECIALIST

## 2021-05-18 PROCEDURE — 2709999900 HC NON-CHARGEABLE SUPPLY: Performed by: SPECIALIST

## 2021-05-18 PROCEDURE — 87635 SARS-COV-2 COVID-19 AMP PRB: CPT

## 2021-05-18 PROCEDURE — 77030020782 HC GWN BAIR PAWS FLX 3M -B: Performed by: SPECIALIST

## 2021-05-18 PROCEDURE — 76060000031 HC ANESTHESIA FIRST 0.5 HR: Performed by: SPECIALIST

## 2021-05-18 RX ORDER — ACETAMINOPHEN 325 MG/1
325 TABLET ORAL
COMMUNITY

## 2021-05-18 RX ORDER — PHENOL/SODIUM PHENOLATE
20 AEROSOL, SPRAY (ML) MUCOUS MEMBRANE 2 TIMES DAILY
Qty: 60 TAB | Refills: 1 | Status: SHIPPED | OUTPATIENT
Start: 2021-05-18

## 2021-05-18 RX ORDER — SODIUM CHLORIDE, SODIUM LACTATE, POTASSIUM CHLORIDE, CALCIUM CHLORIDE 600; 310; 30; 20 MG/100ML; MG/100ML; MG/100ML; MG/100ML
125 INJECTION, SOLUTION INTRAVENOUS CONTINUOUS
Status: DISCONTINUED | OUTPATIENT
Start: 2021-05-18 | End: 2021-05-18 | Stop reason: HOSPADM

## 2021-05-18 RX ORDER — PROPOFOL 10 MG/ML
INJECTION, EMULSION INTRAVENOUS AS NEEDED
Status: DISCONTINUED | OUTPATIENT
Start: 2021-05-18 | End: 2021-05-18 | Stop reason: HOSPADM

## 2021-05-18 RX ORDER — SUCRALFATE 1 G/10ML
1 SUSPENSION ORAL 3 TIMES DAILY
Qty: 420 ML | Refills: 1 | Status: SHIPPED | OUTPATIENT
Start: 2021-05-18

## 2021-05-18 RX ORDER — LIDOCAINE HYDROCHLORIDE 20 MG/ML
INJECTION, SOLUTION EPIDURAL; INFILTRATION; INTRACAUDAL; PERINEURAL AS NEEDED
Status: DISCONTINUED | OUTPATIENT
Start: 2021-05-18 | End: 2021-05-18 | Stop reason: HOSPADM

## 2021-05-18 RX ADMIN — LIDOCAINE HYDROCHLORIDE 100 MG: 20 INJECTION, SOLUTION EPIDURAL; INFILTRATION; INTRACAUDAL; PERINEURAL at 13:37

## 2021-05-18 RX ADMIN — SODIUM CHLORIDE, SODIUM LACTATE, POTASSIUM CHLORIDE, AND CALCIUM CHLORIDE 125 ML/HR: 600; 310; 30; 20 INJECTION, SOLUTION INTRAVENOUS at 09:45

## 2021-05-18 RX ADMIN — PROPOFOL 150 MG: 10 INJECTION, EMULSION INTRAVENOUS at 13:37

## 2021-05-18 RX ADMIN — SODIUM CHLORIDE, SODIUM LACTATE, POTASSIUM CHLORIDE, AND CALCIUM CHLORIDE 125 ML/HR: 600; 310; 30; 20 INJECTION, SOLUTION INTRAVENOUS at 12:45

## 2021-05-18 NOTE — DISCHARGE INSTRUCTIONS
Alexia Elizabeth  [id number]  1990    EGD DISCHARGE INSTRUCTIONS    Discomfort:  Sore throat- throat lozenges or warm salt water gargle  redness at IV site- apply warm compress to area; if redness or soreness persist- contact your physician  Gaseous discomfort- walking, belching will help relieve any discomfort  You should not operate a vehicle for 12 hours  You should note engage in an occupation involving machinery or appliances for rest of today  You may note drink alcoholic beverages for at least 12 hours  Avoid making any critical decisions for at least 24 hour  DIET  You may resume your regular diet - however -  remember your colon is empty and a heavy meal will produce gas. Avoid these foods:  vegetables, fried / greasy foods, carbonated drinks    ACTIVITY  You may resume your normal daily activities   Spend the remainder of the day resting -  avoid any strenuous activity. CALL M.D. ANY SIGN OF   Increasing pain, nausea, vomiting  Abdominal distension (swelling)  New increased bleeding (oral or rectal)  Fever (chills)  Pain in chest area  Bloody discharge from nose or mouth  Shortness of breath       Follow-up Instructions:   Dr Dariela Blevins will call you in one to two weeks. If you do not hear from him, please call his office [phone]. Alexia Elizabeth  [id number]  1990        DISCHARGE SUMMARY from Nurse    The following personal items collected during your admission are returned to you:   Dental Appliance: Dental Appliances: None  Vision: Visual Aid: Glasses, With patient  Hearing Aid:    Raheel Puff: Jewelry: Body Piercing, Earrings, Ring  Clothing: Clothing: Shirt, Pants, Jacket/Coat, Undergarments, Socks, Footwear  Other Valuables:  Other Valuables: Cell Phone(in safe )  Valuables sent to safe:      PATIENT INSTRUCTIONS:    Take Home Medications:    DISCHARGE SUMMARY from Nurse    PATIENT INSTRUCTIONS:    After general anesthesia or intravenous sedation, for 24 hours or while taking prescription Narcotics:  · Limit your activities  · Do not drive and operate hazardous machinery  · Do not make important personal or business decisions  · Do  not drink alcoholic beverages  · If you have not urinated within 8 hours after discharge, please contact your surgeon on call. Report the following to your surgeon:  · Excessive pain, swelling, redness or odor of or around the surgical area  · Temperature over 100.5  · Nausea and vomiting lasting longer than 4 hours or if unable to take medications  · Any signs of decreased circulation or nerve impairment to extremity: change in color, persistent  numbness, tingling, coldness or increase pain  · Any questions    What to do at Home:  Recommended activity: Activity as tolerated and No driving while on analgesics,     If you experience any of the following symptoms fever, chills, persistent nausea/vomiting and/or uncontrollable pain, please follow up with Dr. Mari Bianchi. *  Please give a list of your current medications to your Primary Care Provider. *  Please update this list whenever your medications are discontinued, doses are      changed, or new medications (including over-the-counter products) are added. *  Please carry medication information at all times in case of emergency situations. These are general instructions for a healthy lifestyle:    No smoking/ No tobacco products/ Avoid exposure to second hand smoke  Surgeon General's Warning:  Quitting smoking now greatly reduces serious risk to your health.     Obesity, smoking, and sedentary lifestyle greatly increases your risk for illness    A healthy diet, regular physical exercise & weight monitoring are important for maintaining a healthy lifestyle    You may be retaining fluid if you have a history of heart failure or if you experience any of the following symptoms:  Weight gain of 3 pounds or more overnight or 5 pounds in a week, increased swelling in our hands or feet or shortness of breath while lying flat in bed. Please call your doctor as soon as you notice any of these symptoms; do not wait until your next office visit. The discharge information has been reviewed with the patient and caregiver. The patient and caregiver verbalized understanding. Discharge medications reviewed with the patient and caregiver and appropriate educational materials and side effects teaching were provided. ___________________________________________________________________________________________________________________________________         Learning About Coronavirus ([id number])  What is coronavirus (COVID-19)? COVID-19 is a disease caused by a new type of coronavirus. This illness was first found in December 2019. It has since spread worldwide. Coronaviruses are a large group of viruses. They cause the common cold. They also cause more serious illnesses like [place] respiratory syndrome (MERS) and severe acute respiratory syndrome (SARS). COVID-19 is caused by a novel coronavirus. That means it's a new type that has not been seen in people before. What are the symptoms? Coronavirus (COVID-19) symptoms may include:  · Fever. · Cough. · Trouble breathing. · Chills or repeated shaking with chills. · Muscle pain. · Headache. · Sore throat. · New loss of taste or smell. · Vomiting. · Diarrhea. In severe cases, COVID-19 can cause pneumonia and make it hard to breathe without help from a machine. It can cause death. How is it diagnosed? COVID-19 is diagnosed with a viral test. This may also be called a PCR test or antigen test. It looks for evidence of the virus in your breathing passages or lungs (respiratory system). The test is most often done on a sample from the nose, throat, or lungs. It's sometimes done on a sample of saliva. One way a sample is collected is by putting a long swab into the back of your nose. How is it treated?   Mild cases of COVID-19 can be treated at home. Serious cases need treatment in the hospital. Treatment may include medicines to reduce symptoms, plus breathing support such as oxygen therapy or a ventilator. Some people may be placed on their belly to help their oxygen levels. Treatments that may help people who have COVID-19 include:  Antiviral medicines. These medicines treat viral infections. Remdesivir is an example. Immune-based therapy. These medicines help the immune system fight COVID-19. One example is bamlanivimab. It's a monoclonal antibody. Blood thinners. These medicines help prevent blood clots. People with severe illness are at risk for blood clots. How can you protect yourself and others? The best way to protect yourself from getting sick is to:  · Avoid areas where there is an outbreak. · Avoid contact with people who may be infected. · Avoid crowds and try to stay at least 6 feet away from other people. · Wash your hands often, especially after you cough or sneeze. Use soap and water, and scrub for at least 20 seconds. If soap and water aren't available, use an alcohol-based hand . · Avoid touching your mouth, nose, and eyes. To help avoid spreading the virus to others:  · Stay home if you are sick or have been exposed to the virus. Don't go to school, work, or public areas. And don't use public transportation, ride-shares, or taxis unless you have no choice. · Wear a cloth face cover if you have to go to public areas. · Cover your mouth with a tissue when you cough or sneeze. Then throw the tissue in the trash and wash your hands right away. · If you're sick:  ? Leave your home only if you need to get medical care. But call the doctor's office first so they know you're coming. And wear a face cover. ? Wear the face cover whenever you're around other people. It can help stop the spread of the virus when you cough or sneeze. ? Limit contact with pets and people in your home.  If possible, stay in a separate bedroom and use a separate bathroom. ? Clean and disinfect your home every day. Use household  and disinfectant wipes or sprays. Take special care to clean things that you grab with your hands. These include doorknobs, remote controls, phones, and handles on your refrigerator and microwave. And don't forget countertops, tabletops, bathrooms, and computer keyboards. When should you call for help? Call 911 anytime you think you may need emergency care. For example, call if you have life-threatening symptoms, such as:    · You have severe trouble breathing. (You can't talk at all.)     · You have constant chest pain or pressure.     · You are severely dizzy or lightheaded.     · You are confused or can't think clearly.     · Your face and lips have a blue color.     · You pass out (lose consciousness) or are very hard to wake up. Call your doctor now or seek immediate medical care if:    · You have moderate trouble breathing. (You can't speak a full sentence.)     · You are coughing up blood (more than about 1 teaspoon).     · You have signs of low blood pressure. These include feeling lightheaded; being too weak to stand; and having cold, pale, clammy skin. Watch closely for changes in your health, and be sure to contact your doctor if:    · Your symptoms get worse.     · You are not getting better as expected. Call before you go to the doctor's office. Follow their instructions. And wear a cloth face cover. Current as of: December 18, 2020               Content Version: 12.8  © 2006-2021 [employer]. Care instructions adapted under license by [employer] (which disclaims liability or warranty for this information). If you have questions about a medical condition or this instruction, always ask your healthcare professional. [place] [address] any warranty or liability for your use of this information.   Patient armband removed and shredded

## 2021-05-18 NOTE — ANESTHESIA POSTPROCEDURE EVALUATION
Post-Anesthesia Evaluation and Assessment    Cardiovascular Function/Vital Signs  Visit Vitals  BP (!) 134/90   Pulse 94   Temp 36.5 °C (97.7 °F)   Resp 15   Ht 5' 5\" (1.651 m)   Wt 108.9 kg (240 lb 2 oz)   SpO2 98%   BMI 39.96 kg/m²       Patient is status post Procedure(s):  EGD. Nausea/Vomiting: Controlled. Postoperative hydration reviewed and adequate. Pain:  Pain Scale 1: Numeric (0 - 10) (05/18/21 0907)  Pain Intensity 1: 7 (05/18/21 2056)   Managed. Neurological Status:   Neuro (WDL): Within Defined Limits (05/18/21 0931)   At baseline. Mental Status and Level of Consciousness: Arousable. Pulmonary Status:   O2 Device: None (05/18/21 1341)   Adequate oxygenation and airway patent. Complications related to anesthesia: None    Post-anesthesia assessment completed. No concerns. Patient has met all discharge requirements.     Signed By: Demetrio Hall CRNA    May 18, 2021

## 2021-05-18 NOTE — H&P
EGD - History and Physical    Subjective: The patient is a 28 y.o. obese female who is 5 weeks post gastric bypass with recent admission for a suspected food bolus that impacted both her proximal and distal anastomosis. Since that admission (and subsequent discharge) she has had significant nausea and overall PO intolerances. She understands the need for EGD for further evaluation. Patient Active Problem List    Diagnosis Date Noted    Partial small bowel obstruction ([facility] 2021    Vomiting 2021    Intestinal malabsorption 2021    S/P gastric bypass 2021    Former smoker     Morbid obesity ([address] 2020    Morbid obesity with BMI of 45.0-49.9, adult ([facility] [address] 2020    Smoker 2020    Back pain 2020    Knee pain 2020    Anemia     Irregular menses       Past Surgical History:   Procedure Laterality Date    HX LAP GASTRIC BYPASS  2021    Dr Greta Yin History     Tobacco Use    Smoking status: Former Smoker     Types: Cigarettes     Quit date: 2021     Years since quittin.3    Smokeless tobacco: Never Used    Tobacco comment: 1 black and mild a week   Substance Use Topics    Alcohol use: Not Currently     Frequency: Never      Family History   Problem Relation Age of Onset    No Known Problems Mother       Current Outpatient Medications   Medication Sig Dispense Refill    B.infantis-B.ani-B.long-B.bifi (Probiotic 4X) 10-15 mg TbEC Take  by mouth.  Omeprazole delayed release (PRILOSEC D/R) 20 mg tablet Take 1 Tab by mouth daily. 30 Tab 1    multivitamin (ONE A DAY) tablet Take 1 Tab by mouth daily.        No Known Allergies       Review of Systems:        General - No history or complaints of unexpected fever, chills, or weight loss  Head/Neck - No history or complaints of headache, diplopia, dysphagia, hearing loss  Cardiac - No history or complaints of chest pain, palpitations, murmur, or shortness of breath  Pulmonary - No history or complaints of shortness of breath, productive cough, hemoptysis  Gastrointestinal - No history or complaints of reflux,  abdominal pain, obstipation/constipation, blood per rectum  Genitourinary - No history or complaints of hematuria/dysuria, stress urinary incontinence symptoms, or renal lithiasis  Musculoskeletal - No history or complaints of joint pain or muscular weakness  Hematologic - No history or complaints of bleeding disorders, blood transfusions, sickle cell anemia  Neurologic - No history or complaints of  migraine headaches, seizure activity, syncopal episodes, TIA or stroke  Integumentary - No history or complaints of rashes, abnormal nevi, skin cancer  Gynecological - unremarkable    Objective: There were no vitals taken for this visit.       Physical Examination: General appearance - alert, well appearing, and in no distress and oriented to person, place, and time  Mental status - alert, oriented to person, place, and time, normal mood, behavior, speech, dress, motor activity, and thought processes  Eyes - pupils equal and reactive, extraocular eye movements intact, sclera anicteric, left eye normal, right eye normal  Ears - right ear normal, left ear normal  Nose - normal and patent, no erythema, discharge or polyps  Mouth - mucous membranes moist, pharynx normal without lesions  Neck - supple, no significant adenopathy  Lymphatics - no palpable lymphadenopathy, no hepatosplenomegaly  Chest - clear to auscultation, no wheezes, rales or rhonchi, symmetric air entry  Heart - normal rate, regular rhythm, normal S1, S2, no murmurs, rubs, clicks or gallops  Abdomen - soft, nontender, nondistended, no masses or organomegaly  Back exam - full range of motion, no tenderness, palpable spasm or pain on motion  Neurological - alert, oriented, normal speech, no focal findings or movement disorder noted  Musculoskeletal - no joint tenderness, deformity or swelling  Extremities - peripheral pulses normal, no pedal edema, no clubbing or cyanosis  Skin - normal coloration and turgor, no rashes, no suspicious skin lesions noted    Labs / Preoperative Evaluations:     Recent Results (from the past 1008 hour(s))   NOVEL CORONAVIRUS (COVID-19)    Collection Time: 04/07/21 10:54 AM   Result Value Ref Range    SARS-CoV-2 Not Detected Not Detected     [facility] SPE[facility].     Collection Time: 04/08/21  3:09 PM   Result Value Ref Range    [facility] SPEC[facility] COL Specimens collected/sent to [facility]     CBC WITH AUTOMATED DIFF    Collection Time: 04/08/21  3:10 PM   Result Value Ref Range    WBC 11.4 (H) 4.0 - 11.0 K/uL    RBC 5.68 (H) 3.80 - 5.20 M/uL    HGB 12.7 11.7 - 15.5 g/dL    HCT 42.6 35.1 - 46.5 %    MCV 75 (L) 81 - 99 fL    MCH 22 (L) 26 - 34 pg    MCHC 30 (L) 31 - 36 g/dL    RDW 16.8 (H) 10.0 - 15.5 %    PLATELET 287 450 - 246 K/uL    MPV 11.5 9.0 - 13.0 fL   DIFFERENTIAL, MANUAL    Collection Time: 04/08/21  3:10 PM   Result Value Ref Range    Anisocytosis 1+ (A) (none)    Microcytes 1+ (A) (none)    Smudge cells 1+ (A) (none) per 100 WBCs    NEUTROPHILS C MAN (DIFF) 33 (L) 40 - 75 %    LYMPHOCYTES C MAN (DIFF) 61 (H) 20 - 45 %    MONOCYTES C MAN (DIFF) 5 3 - 12 %    BASOPHILS C MAN (DIFF) 1 0 - 2 %    Neutrophils abs 3.7 1.8 - 7.7 K/uL    Lymphs abs-DIF 7.0 (H) 1.0 - 4.8 K/uL    Monocytes abs-DIF 0.6 0.1 - 1.0 K/uL    Basophils abs-DIF 0.1 0.0 - 0.2 K/uL    NORMOCHROMIC CELLAVISION Normochromic     SMEAR EVAL Platelet morphology appears normal.    HCG URINE, QL. - POC    Collection Time: 04/13/21  6:10 AM   Result Value Ref Range    Pregnancy test,urine (POC) Negative NEG     CBC WITH AUTOMATED DIFF    Collection Time: 04/13/21  2:55 PM   Result Value Ref Range    WBC 13.0 4.6 - 13.2 K/uL    RBC 5.06 4.20 - 5.30 M/uL    HGB 11.1 (L) 12.0 - 16.0 g/dL    HCT 37.7 35.0 - 45.0 %    MCV 74.5 74.0 - 97.0 FL    MCH 21.9 (L) 24.0 - 34.0 PG    MCHC 29.4 (L) 31.0 - 37.0 g/dL RDW 15.9 (H) 11.6 - 14.5 %    PLATELET 286 353 - 679 K/uL    MPV 11.5 9.2 - 11.8 FL    NEUTROPHILS 81 (H) 40 - 73 %    LYMPHOCYTES 14 (L) 21 - 52 %    MONOCYTES 5 3 - 10 %    EOSINOPHILS 0 0 - 5 %    BASOPHILS 0 0 - 2 %    ABS. NEUTROPHILS 10.5 (H) 1.8 - 8.0 K/UL    ABS. LYMPHOCYTES 1.8 0.9 - 3.6 K/UL    ABS. MONOCYTES 0.6 0.05 - 1.2 K/UL    ABS. EOSINOPHILS 0.0 0.0 - 0.4 K/UL    ABS. BASOPHILS 0.0 0.0 - 0.1 K/UL    DF AUTOMATED     METABOLIC PANEL, BASIC    Collection Time: 05/04/21  7:25 AM   Result Value Ref Range    Sodium 138 136 - 145 mmol/L    Potassium 3.2 (L) 3.5 - 5.5 mmol/L    Chloride 103 100 - 111 mmol/L    CO2 28 21 - 32 mmol/L    Anion gap 7 3.0 - 18 mmol/L    Glucose 115 (H) 74 - 99 mg/dL    BUN 4 (L) 7.0 - 18 MG/DL    Creatinine 0.37 (L) 0.6 - 1.3 MG/DL    BUN/Creatinine ratio 11 (L) 12 - 20      GFR est AA >60 >60 ml/min/1.73m2    GFR est non-AA >60 >60 ml/min/1.73m2    Calcium 8.7 8.5 - 10.1 MG/DL   MAGNESIUM    Collection Time: 05/04/21  7:25 AM   Result Value Ref Range    Magnesium 1.9 1.6 - 2.6 mg/dL   METABOLIC PANEL, BASIC    Collection Time: 05/05/21  2:10 AM   Result Value Ref Range    Sodium 139 136 - 145 mmol/L    Potassium 3.5 3.5 - 5.5 mmol/L    Chloride 105 100 - 111 mmol/L    CO2 27 21 - 32 mmol/L    Anion gap 7 3.0 - 18 mmol/L    Glucose 124 (H) 74 - 99 mg/dL    BUN 2 (L) 7.0 - 18 MG/DL    Creatinine 0.41 (L) 0.6 - 1.3 MG/DL    BUN/Creatinine ratio 5 (L) 12 - 20      GFR est AA >60 >60 ml/min/1.73m2    GFR est non-AA >60 >60 ml/min/1.73m2    Calcium 9.1 8.5 - 10.1 MG/DL   MAGNESIUM    Collection Time: 05/05/21  2:10 AM   Result Value Ref Range    Magnesium 1.7 1.6 - 2.6 mg/dL       Assessment:     5 weeks post gastric bypass with need for EGD due to history of significant food bolus impaction and cont dysphagia. Plan:     EGD    Plan for EGD. She understands the risks, benefits and alternatives of the EGD.   She understands the risk include but are not limited to; death, DVT/PE, damage to surrounding structures, perforation of the GI tract, equipment malfunction, bleeding, and infection. She understands all of the above and wishes to proceed with EGD.         Signed By: KIA Saxena     May 18, 2021

## 2021-05-18 NOTE — PROCEDURES
Esophagogastroduodenoscopy Procedure Note    Indications: 28 y.o. obese female who is 5 weeks post gastric bypass with recent admission for a suspected food bolus that impacted both her proximal and distal anastomosis. Since that admission (and subsequent discharge) she has had significant nausea and overall PO intolerances. She understands the need for EGD for further evaluation. Anesthesia/Sedation: MAC anesthesia    Pre-Procedure Exam:  Airway: clear   Heart: normal S1and S2    Lungs: clear bilateral  Abdomen: soft, nontender, bowel sounds present and normal in all quadrants   Mental Status: awake, alert, and oriented to person, place, and time      Procedure in Detail:  Informed consent was obtained for the procedure, including conscious sedation. Risks of pancreatitis, infection, perforation, hemorrhage, adverse drug reaction, and aspiration were discussed. The patient was placed in the left lateral decubitus position. Based on the pre-procedure assessment, including review of the patient's medical history, medications, allergies, and review of systems, he had been deemed to be an appropriate candidate for moderate sedation; he was therefore sedated with the medications listed above. He was monitored continuously with electrocardiogram tracing, pulse oximetry, blood pressure monitoring, and direct observation. The [id number] gastroscope was inserted into the mouth and advanced under direct vision to proximal jeanne limb. A careful inspection was made as the gastroscope was withdrawn, including a retroflexed view of the proximal stomach; findings and interventions are described below. Appropriate photodocumentation was obtained.     Findings:   Oropharynx - normal mucosa without abnormalities  Esophagus - normal mucosa without webs, rings, or strictures, no esophagitis or ulcers  Gastric pouch - normal size and configuration with normal mucosa without evidence of ulcers   Anastomosis - 8mm shallow ulceration at the 12 o'clock position without significant stricture or other abnormalities  Odilon limb - normal mucosa without ulcers or obstruction.    - Ensure carafate and double dose PPI use. - Repeat EGD in 2 months    Therapies:    none    Specimens: No specimens were collected. Complications:   None; patient tolerated the procedure well. EBL:  None           Attending Attestation:  I performed the procedure. Recommendations:  - Follow symptoms.  - Follow up with me. - Ensure carafate and PPI use    Signed by:  Payton Bravo MD

## 2021-05-18 NOTE — ANESTHESIA PREPROCEDURE EVALUATION
Relevant Problems   No relevant active problems       Anesthetic History        Pertinent negatives: No PONV       Review of Systems / Medical History  Patient summary reviewed and pertinent labs reviewed    Pulmonary              Pertinent negatives: No sleep apnea and smoker     Neuro/Psych         Psychiatric history     Cardiovascular                  Exercise tolerance: >4 METS     GI/Hepatic/Renal  Within defined limits           Pertinent negatives: No hiatal hernia and GERD   Endo/Other        Morbid obesity     Other Findings              Physical Exam    Airway  Mallampati: I  TM Distance: > 6 cm  Neck ROM: normal range of motion   Mouth opening: Normal     Cardiovascular    Rhythm: regular  Rate: normal         Dental  No notable dental hx       Pulmonary  Breath sounds clear to auscultation               Abdominal  GI exam deferred       Other Findings            Anesthetic Plan    ASA: 3  Anesthesia type: MAC          Induction: Intravenous  Anesthetic plan and risks discussed with: Patient

## 2021-05-24 ENCOUNTER — TELEPHONE (OUTPATIENT)
Dept: SURGERY | Age: 31
End: 2021-05-24

## 2021-05-24 RX ORDER — URSODIOL 500 MG/1
500 TABLET, FILM COATED ORAL DAILY
Qty: 30 TABLET | Refills: 4 | Status: SHIPPED | OUTPATIENT
Start: 2021-05-24

## 2021-05-24 NOTE — TELEPHONE ENCOUNTER
Patient is currently on a soft food diet without difficulty. Patient is hydrating with    70 ounces, daily. Patient is tolerating the following sources of protein: eggs, chicken, turkey sausage. She is tolerating one protein shake daily. Patient is exercising by walking four days, 30 minutes each day. Patient has not had any readmissions, reoperations, complications, ED visits, nor IVF at [facility] during her first post-op month. She continues with Carafate and PPI. Patient was reminded of the followin. Start 500mg [employer] today, stop after 6 months out from surgery  2. Negative for H.pylori   3. Patient is cleared to return to work without restrictions. 4. Can stop probiotic    5. Advance diet to solid phase. Reminded to measure portions, continue high protein, low carbohydrate diet. Reminded to eat regularly, to eat slowly & not to drink with meals. Refer to the handbook and video. 6. Continue multi-vitamin with iron and add the additional vitamin supplementation (calcium citrate 1,500 mg per day taken one hour apart from your other vitamins/supplements, vitamin B complex one a day, vitamin B12 1,000 mcg daily taken sublingually, vitamin D3 3,000 IU per day, all listed in handbook)  7. Continue current medications and follow up with PCP for management of regimen  8. Continue cardio exercise and add resistance exercises. Discussed with patient. Minimum of 30 minutes of exercise daily, five days a week  9. Encouraged to attend support group   10. I have discussed this plan with patient, and they verbalized understanding. 11. Follow-up at three month appointment or sooner if patient has questions, concerns or worsening of condition. If unable to reach our office, patient should report to the ED. 12. One month nutrition video to include the above mentioned education e-mailed to patient.     13. Patient was reminded to  in the office the nutrition educational folder to include the above mentioned education.

## 2021-07-12 ENCOUNTER — OFFICE VISIT (OUTPATIENT)
Dept: SURGERY | Age: 31
End: 2021-07-12
Payer: MEDICAID

## 2021-07-12 VITALS
DIASTOLIC BLOOD PRESSURE: 76 MMHG | SYSTOLIC BLOOD PRESSURE: 118 MMHG | HEIGHT: 65 IN | OXYGEN SATURATION: 100 % | BODY MASS INDEX: 35.64 KG/M2 | HEART RATE: 90 BPM | RESPIRATION RATE: 16 BRPM | WEIGHT: 213.9 LBS

## 2021-07-12 DIAGNOSIS — Z98.84 S/P GASTRIC BYPASS: ICD-10-CM

## 2021-07-12 DIAGNOSIS — K90.9 INTESTINAL MALABSORPTION, UNSPECIFIED TYPE: Primary | ICD-10-CM

## 2021-07-12 PROCEDURE — 99214 OFFICE O/P EST MOD 30 MIN: CPT | Performed by: SPECIALIST

## 2021-07-12 NOTE — PATIENT INSTRUCTIONS
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
## 2022-03-18 PROBLEM — F17.200 SMOKER: Status: ACTIVE | Noted: 2020-08-25

## 2022-03-18 PROBLEM — K56.600 PARTIAL SMALL BOWEL OBSTRUCTION (HCC): Status: ACTIVE | Noted: 2021-05-07

## 2022-03-18 PROBLEM — R11.10 VOMITING: Status: ACTIVE | Noted: 2021-05-04

## 2022-03-18 PROBLEM — Z98.84 S/P GASTRIC BYPASS: Status: ACTIVE | Noted: 2021-04-29

## 2022-03-19 PROBLEM — E66.01 MORBID OBESITY WITH BMI OF 45.0-49.9, ADULT (HCC): Status: ACTIVE | Noted: 2020-08-25

## 2022-03-19 PROBLEM — M25.569 KNEE PAIN: Status: ACTIVE | Noted: 2020-08-25

## 2022-03-19 PROBLEM — E66.01 MORBID OBESITY (HCC): Status: ACTIVE | Noted: 2020-08-25

## 2022-03-19 PROBLEM — M54.9 BACK PAIN: Status: ACTIVE | Noted: 2020-08-25

## 2022-03-20 PROBLEM — K90.9 INTESTINAL MALABSORPTION: Status: ACTIVE | Noted: 2021-04-29

## 2022-03-23 ENCOUNTER — DOCUMENTATION ONLY (OUTPATIENT)
Dept: SURGERY | Age: 32
End: 2022-03-23

## 2022-03-23 NOTE — PROGRESS NOTES
Per [facility] requirements;  E-mail and letter sent for follow up appointment. [facility]  [address], [address] [facility] [address]                 [facility] [place]  [facility]  [facility]      Dear Patient,    Your health is our main concern. It is important for your health to have follow-up lab work and to see your surgeon at 2 months, 4 months, 6 months, 9 months and annually after your weight loss surgery. Additionally, the Department of Bariatric Surgery at our hospital is a member of the Metabolic and Bariatric Surgery Accreditation and Quality Improvement Program [facility]). As a participant in this program, we gather information on the outcomes of our patients after surgery. Please call the office for a follow up appointment at [phone]. If you have moved out of the area or have changed surgeons please call us and let us know the name of your doctor. Your health and feedback are important to us. We greatly appreciate your response.        Thank you,  [facility] [address]

## 2023-01-18 ENCOUNTER — OFFICE VISIT (OUTPATIENT)
Dept: SURGERY | Age: 33
End: 2023-01-18
Payer: MEDICAID

## 2023-01-18 VITALS — RESPIRATION RATE: 16 BRPM

## 2023-01-18 DIAGNOSIS — Z87.891 FORMER SMOKER: ICD-10-CM

## 2023-01-18 DIAGNOSIS — Z98.84 S/P GASTRIC BYPASS: ICD-10-CM

## 2023-01-18 DIAGNOSIS — K90.9 INTESTINAL MALABSORPTION, UNSPECIFIED TYPE: ICD-10-CM

## 2023-01-18 DIAGNOSIS — R11.10 VOMITING, UNSPECIFIED VOMITING TYPE, UNSPECIFIED WHETHER NAUSEA PRESENT: Primary | ICD-10-CM

## 2023-01-18 DIAGNOSIS — D64.9 ANEMIA, UNSPECIFIED TYPE: ICD-10-CM

## 2023-01-18 PROBLEM — E66.01 MORBID OBESITY (HCC): Status: RESOLVED | Noted: 2020-08-25 | Resolved: 2023-01-18

## 2023-01-18 PROBLEM — M25.569 KNEE PAIN: Status: RESOLVED | Noted: 2020-08-25 | Resolved: 2023-01-18

## 2023-01-18 PROBLEM — E66.01 MORBID OBESITY WITH BMI OF 45.0-49.9, ADULT (HCC): Status: RESOLVED | Noted: 2020-08-25 | Resolved: 2023-01-18

## 2023-01-18 PROBLEM — F17.200 SMOKER: Status: RESOLVED | Noted: 2020-08-25 | Resolved: 2023-01-18

## 2023-01-18 PROBLEM — K56.600 PARTIAL SMALL BOWEL OBSTRUCTION (HCC): Status: RESOLVED | Noted: 2021-05-07 | Resolved: 2023-01-18

## 2023-01-18 RX ORDER — FERROUS SULFATE TAB 325 MG (65 MG ELEMENTAL FE) 325 (65 FE) MG
TAB ORAL
COMMUNITY
Start: 2022-12-20

## 2023-01-18 RX ORDER — ONDANSETRON 4 MG/1
TABLET, ORALLY DISINTEGRATING ORAL
COMMUNITY
Start: 2023-01-14

## 2023-01-18 RX ORDER — PROMETHAZINE HYDROCHLORIDE 12.5 MG/1
TABLET ORAL
COMMUNITY
Start: 2023-01-09

## 2023-01-18 RX ORDER — PYRIDOXINE HCL (VITAMIN B6) 25 MG
25 TABLET ORAL DAILY
COMMUNITY
Start: 2022-12-09 | End: 2023-12-09

## 2023-01-18 RX ORDER — ERGOCALCIFEROL 1.25 MG/1
CAPSULE ORAL
COMMUNITY
Start: 2023-01-17

## 2023-01-18 RX ORDER — SERTRALINE HYDROCHLORIDE 50 MG/1
TABLET, FILM COATED ORAL
COMMUNITY
Start: 2023-01-09

## 2023-01-18 RX ORDER — HYDROXYZINE 25 MG/1
TABLET, FILM COATED ORAL
COMMUNITY
Start: 2023-01-13

## 2023-01-18 RX ORDER — VALACYCLOVIR HYDROCHLORIDE 500 MG/1
TABLET, FILM COATED ORAL
COMMUNITY
Start: 2022-12-20

## 2023-01-18 RX ORDER — SCOLOPAMINE TRANSDERMAL SYSTEM 1 MG/1
PATCH, EXTENDED RELEASE TRANSDERMAL
COMMUNITY
Start: 2023-01-14

## 2023-01-18 NOTE — PATIENT INSTRUCTIONS
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
## 2024-02-20 DIAGNOSIS — R10.13 EPIGASTRIC PAIN: Primary | ICD-10-CM

## (undated) DEVICE — STERILE POLYISOPRENE POWDER-FREE SURGICAL GLOVES: Brand: PROTEXIS

## (undated) DEVICE — ENDOCUT SCISSOR TIP, DISPOSABLE: Brand: RENEW

## (undated) DEVICE — SUT MONOCRYL PLUS UD 4-0 --

## (undated) DEVICE — TOTAL TRAY, 16FR 10ML SIL FOLEY, URN: Brand: MEDLINE

## (undated) DEVICE — BARIATRIC: Brand: MEDLINE INDUSTRIES, INC.

## (undated) DEVICE — TROCAR ENDOSCP L100MM DIA12MM STBL SL BLDELSS ENDOPATH XCEL

## (undated) DEVICE — AGENT HEMSTAT W6XL9IN OXIDIZED REGENERATED CELOS ABSRB FOR

## (undated) DEVICE — SLEEVE TRCR L100MM DIA5MM UNIV STBL FOR BLDELSS DIL TIP

## (undated) DEVICE — SOLUTION SURG PREP 26 CC PURPREP

## (undated) DEVICE — RESERVOIR,SUCTION,100CC,SILICONE: Brand: MEDLINE

## (undated) DEVICE — [HIGH FLOW INSUFFLATOR,  DO NOT USE IF PACKAGE IS DAMAGED,  KEEP DRY,  KEEP AWAY FROM SUNLIGHT,  PROTECT FROM HEAT AND RADIOACTIVE SOURCES.]: Brand: PNEUMOSURE

## (undated) DEVICE — Device

## (undated) DEVICE — VISUALIZATION SYSTEM: Brand: CLEARIFY

## (undated) DEVICE — CUTTER ENDOSCP L340MM LIN ARTC SGL STROKE FIRING ENDOPATH

## (undated) DEVICE — DEVICE INFL 60ML 12ATM CONVENIENT LOK REL HNDL HI PRSS FLX

## (undated) DEVICE — SUTURE ETHIB EXCL BR GRN TAPR PT 2-0 30 X563H X563H

## (undated) DEVICE — GARMENT,MEDLINE,DVT,INT,CALF,MED, GEN2: Brand: MEDLINE

## (undated) DEVICE — SUTURE ETHLN SZ 3-0 L30IN NONABSORBABLE BLK FSL L30MM 3/8 1671H

## (undated) DEVICE — APPLICATOR LAP 35 CM 2 RIGID VISTASEAL

## (undated) DEVICE — SUTURE PDS II SZ 0 L27IN ABSRB VLT L26MM CT-2 1/2 CIR Z334H

## (undated) DEVICE — SOL IRRIGATION INJ NACL 0.9% 500ML BTL

## (undated) DEVICE — SUT ETHLN 3-0 18IN PS1 BLK --

## (undated) DEVICE — NEEDLE INSUF L150MM DIA2MM DISP FOR PNEUMOPERI ENDOPATH

## (undated) DEVICE — APPLIER CLP L SHFT DIA12MM 20 ROT MULT LIGACLP

## (undated) DEVICE — SUT PROL 2-0 30IN CT2 BLU --

## (undated) DEVICE — FORCEPS BX OVL CUP SERR DISP CAP L 240CM RAD JAW 4

## (undated) DEVICE — RELOAD STPL H1-2.5X45MM VASC THN TISS WHT 6 ROW B FRM SGL

## (undated) DEVICE — TROCAR LAP L100MM DIA5MM BLDELSS W/ STBL SL ENDOPATH XCEL

## (undated) DEVICE — CLIP SUT ENDOSCP F/2-0/3-0/4-0 -- LAPRA-TY

## (undated) DEVICE — STAPLER INT L37CM STPL 21MM CIR ENDOSCP CRV INTLUMN B FRM

## (undated) DEVICE — SHEAR HARMONIC 5MMX45CM -- ACE 7+

## (undated) DEVICE — STRAP,POSITIONING,KNEE/BODY,FOAM,4X60": Brand: MEDLINE

## (undated) DEVICE — GOWN ISOLATN REG BLU POLY UNISX W/ THMB LOOP

## (undated) DEVICE — SOLUTION LACTATED RINGERS INJECTION USP

## (undated) DEVICE — DISPOSABLE SUCTION/IRRIGATOR TUBE SET WITH TIP: Brand: AHTO

## (undated) DEVICE — TROCAR ENDOSCP L100MM DIA15MM BLDELSS STBL SL ENDOPATH XCEL

## (undated) DEVICE — MOUTHPIECE ENDOSCP 20X27MM --

## (undated) DEVICE — YANKAUER,FLEXIBLE HANDLE,REGLR CAPACITY: Brand: MEDLINE INDUSTRIES, INC.

## (undated) DEVICE — SEALANT TISS 10 CC FOR HUM FIBRIN VISTASEAL

## (undated) DEVICE — TUBING, SUCTION, 1/4" X 12', STRAIGHT: Brand: MEDLINE

## (undated) DEVICE — TROCAR ENDOSCP BLDELSS 12X100 MM W/ HNDL STBL SL OPT TIP

## (undated) DEVICE — MASK O2 O2 LN L7FT CO2 LN L10FT FEM BG 750ML M CONC ADPT

## (undated) DEVICE — RELOAD STPL L60MM H1-2.6MM MESENTERY THN TISS WHT 6 ROW

## (undated) DEVICE — STAPLER SKIN L440MM 32MM LNG 12 FIRING B FRM PWR + GRIPPING

## (undated) DEVICE — TISSUE RETRIEVAL SYSTEM: Brand: INZII RETRIEVAL SYSTEM

## (undated) DEVICE — RELOAD STPL L60MM H1.5-3.6MM REG TISS BLU GRIPPING SURF B

## (undated) DEVICE — MAJ-1414 SINGLE USE ADPATER BIOPSY VALV: Brand: SINGLE USE ADAPTOR BIOPSY VALVE